# Patient Record
Sex: FEMALE | Race: OTHER | NOT HISPANIC OR LATINO
[De-identification: names, ages, dates, MRNs, and addresses within clinical notes are randomized per-mention and may not be internally consistent; named-entity substitution may affect disease eponyms.]

---

## 2018-01-05 PROBLEM — Z00.00 ENCOUNTER FOR PREVENTIVE HEALTH EXAMINATION: Status: ACTIVE | Noted: 2018-01-05

## 2018-01-16 ENCOUNTER — APPOINTMENT (OUTPATIENT)
Dept: VASCULAR SURGERY | Facility: CLINIC | Age: 63
End: 2018-01-16
Payer: COMMERCIAL

## 2018-01-16 VITALS
WEIGHT: 140 LBS | HEIGHT: 66 IN | DIASTOLIC BLOOD PRESSURE: 60 MMHG | SYSTOLIC BLOOD PRESSURE: 130 MMHG | BODY MASS INDEX: 22.5 KG/M2

## 2018-01-16 DIAGNOSIS — Z86.39 PERSONAL HISTORY OF OTHER ENDOCRINE, NUTRITIONAL AND METABOLIC DISEASE: ICD-10-CM

## 2018-01-16 DIAGNOSIS — Z78.9 OTHER SPECIFIED HEALTH STATUS: ICD-10-CM

## 2018-01-16 DIAGNOSIS — Z86.79 PERSONAL HISTORY OF OTHER DISEASES OF THE CIRCULATORY SYSTEM: ICD-10-CM

## 2018-01-16 DIAGNOSIS — Z85.3 PERSONAL HISTORY OF MALIGNANT NEOPLASM OF BREAST: ICD-10-CM

## 2018-01-16 PROCEDURE — 93880 EXTRACRANIAL BILAT STUDY: CPT

## 2018-01-16 PROCEDURE — 99213 OFFICE O/P EST LOW 20 MIN: CPT

## 2018-01-16 RX ORDER — POLYETHYLENE GLYCOL AND PROPYLENE GLYCOL 4; 3 MG/ML; MG/ML
0.4-0.3 SOLUTION/ DROPS OPHTHALMIC
Refills: 0 | Status: ACTIVE | COMMUNITY

## 2018-01-16 RX ORDER — ASPIRIN 325 MG/1
TABLET, FILM COATED ORAL
Refills: 0 | Status: ACTIVE | COMMUNITY

## 2018-01-16 RX ORDER — ANASTROZOLE TABLETS 1 MG/1
1 TABLET ORAL
Refills: 0 | Status: ACTIVE | COMMUNITY

## 2018-01-16 RX ORDER — CETIRIZINE HYDROCHLORIDE 10 MG/1
10 CAPSULE, LIQUID FILLED ORAL
Refills: 0 | Status: ACTIVE | COMMUNITY

## 2018-01-16 RX ORDER — CALCIUM CARBONATE/VITAMIN D3 600 MG-20
600-800 TABLET ORAL
Refills: 0 | Status: ACTIVE | COMMUNITY

## 2018-01-16 RX ORDER — MAGNESIUM OXIDE/MAG AA CHELATE 300 MG
CAPSULE ORAL
Refills: 0 | Status: ACTIVE | COMMUNITY

## 2018-01-16 RX ORDER — LEVOTHYROXINE SODIUM 50 UG/1
50 TABLET ORAL
Refills: 0 | Status: ACTIVE | COMMUNITY

## 2018-01-16 RX ORDER — ROSUVASTATIN CALCIUM 5 MG/1
5 TABLET, FILM COATED ORAL
Refills: 0 | Status: ACTIVE | COMMUNITY

## 2019-01-18 ENCOUNTER — APPOINTMENT (OUTPATIENT)
Dept: VASCULAR SURGERY | Facility: CLINIC | Age: 64
End: 2019-01-18
Payer: MEDICARE

## 2019-01-18 VITALS — WEIGHT: 140 LBS | SYSTOLIC BLOOD PRESSURE: 120 MMHG | DIASTOLIC BLOOD PRESSURE: 70 MMHG | BODY MASS INDEX: 22.6 KG/M2

## 2019-01-18 PROCEDURE — 93880 EXTRACRANIAL BILAT STUDY: CPT

## 2019-01-18 PROCEDURE — 99213 OFFICE O/P EST LOW 20 MIN: CPT

## 2019-01-18 NOTE — CONSULT LETTER
[Dear  ___] : Dear  [unfilled], [Courtesy Letter:] : I had the pleasure of seeing your patient, [unfilled], in my office today. [Please see my note below.] : Please see my note below. [Consult Closing:] : Thank you very much for allowing me to participate in the care of this patient.  If you have any questions, please do not hesitate to contact me. [FreeTextEntry2] : Dr Brayden Johnson

## 2019-01-18 NOTE — DATA REVIEWED
[FreeTextEntry1] : Duplex ultrasound today shows mildly elevated velocities in bilateral carotid arteries consistent with 50% stenosis bilaterally

## 2020-01-17 ENCOUNTER — OTHER (OUTPATIENT)
Age: 65
End: 2020-01-17

## 2020-01-17 ENCOUNTER — APPOINTMENT (OUTPATIENT)
Dept: VASCULAR SURGERY | Facility: CLINIC | Age: 65
End: 2020-01-17

## 2021-04-06 ENCOUNTER — APPOINTMENT (OUTPATIENT)
Dept: VASCULAR SURGERY | Facility: CLINIC | Age: 66
End: 2021-04-06

## 2021-04-12 ENCOUNTER — APPOINTMENT (OUTPATIENT)
Dept: VASCULAR SURGERY | Facility: CLINIC | Age: 66
End: 2021-04-12
Payer: MEDICARE

## 2021-04-12 VITALS — SYSTOLIC BLOOD PRESSURE: 132 MMHG | DIASTOLIC BLOOD PRESSURE: 74 MMHG

## 2021-04-12 DIAGNOSIS — I65.23 OCCLUSION AND STENOSIS OF BILATERAL CAROTID ARTERIES: ICD-10-CM

## 2021-04-12 PROCEDURE — 99213 OFFICE O/P EST LOW 20 MIN: CPT

## 2021-04-12 PROCEDURE — 93880 EXTRACRANIAL BILAT STUDY: CPT

## 2021-04-12 NOTE — ASSESSMENT
[FreeTextEntry1] : The patient is a 65-year-old female who presents for her annual carotid duplex exam. The patient denies amaurosis fugax or TIA like symptoms. I performed a carotid duplex in my office that shows less than 50% internal carotid artery stenosis bilaterally. No vascular surgery intervention at this time. I recommend to remain on antiplatelet regimen daily and I will see her back in my office in one years time

## 2023-10-19 ENCOUNTER — NON-APPOINTMENT (OUTPATIENT)
Age: 68
End: 2023-10-19

## 2024-05-09 ENCOUNTER — APPOINTMENT (OUTPATIENT)
Dept: UROGYNECOLOGY | Facility: CLINIC | Age: 69
End: 2024-05-09
Payer: MEDICARE

## 2024-05-09 VITALS
HEART RATE: 87 BPM | BODY MASS INDEX: 22.02 KG/M2 | HEIGHT: 66 IN | DIASTOLIC BLOOD PRESSURE: 74 MMHG | WEIGHT: 137 LBS | SYSTOLIC BLOOD PRESSURE: 120 MMHG

## 2024-05-09 DIAGNOSIS — Z80.9 FAMILY HISTORY OF MALIGNANT NEOPLASM, UNSPECIFIED: ICD-10-CM

## 2024-05-09 DIAGNOSIS — Z82.49 FAMILY HISTORY OF ISCHEMIC HEART DISEASE AND OTHER DISEASES OF THE CIRCULATORY SYSTEM: ICD-10-CM

## 2024-05-09 DIAGNOSIS — N95.0 POSTMENOPAUSAL BLEEDING: ICD-10-CM

## 2024-05-09 DIAGNOSIS — I65.23 OCCLUSION AND STENOSIS OF BILATERAL CAROTID ARTERIES: ICD-10-CM

## 2024-05-09 DIAGNOSIS — Z84.89 FAMILY HISTORY OF OTHER SPECIFIED CONDITIONS: ICD-10-CM

## 2024-05-09 DIAGNOSIS — N95.2 POSTMENOPAUSAL ATROPHIC VAGINITIS: ICD-10-CM

## 2024-05-09 DIAGNOSIS — Z86.39 PERSONAL HISTORY OF OTHER ENDOCRINE, NUTRITIONAL AND METABOLIC DISEASE: ICD-10-CM

## 2024-05-09 PROCEDURE — 99205 OFFICE O/P NEW HI 60 MIN: CPT | Mod: 25

## 2024-05-09 PROCEDURE — 99459 PELVIC EXAMINATION: CPT

## 2024-05-09 PROCEDURE — 51701 INSERT BLADDER CATHETER: CPT

## 2024-05-09 RX ORDER — ESTRADIOL 0.1 MG/G
0.1 CREAM VAGINAL
Qty: 1 | Refills: 3 | Status: ACTIVE | COMMUNITY
Start: 2024-05-09 | End: 1900-01-01

## 2024-05-13 LAB — URINE CULTURE <10: NORMAL

## 2024-05-30 ENCOUNTER — APPOINTMENT (OUTPATIENT)
Dept: UROGYNECOLOGY | Facility: CLINIC | Age: 69
End: 2024-05-30

## 2024-06-03 ENCOUNTER — APPOINTMENT (OUTPATIENT)
Dept: UROGYNECOLOGY | Facility: CLINIC | Age: 69
End: 2024-06-03
Payer: MEDICARE

## 2024-06-03 VITALS
SYSTOLIC BLOOD PRESSURE: 120 MMHG | DIASTOLIC BLOOD PRESSURE: 68 MMHG | HEART RATE: 77 BPM | HEIGHT: 66 IN | BODY MASS INDEX: 22.18 KG/M2 | WEIGHT: 138 LBS

## 2024-06-03 DIAGNOSIS — N81.3 COMPLETE UTEROVAGINAL PROLAPSE: ICD-10-CM

## 2024-06-03 DIAGNOSIS — R33.9 RETENTION OF URINE, UNSPECIFIED: ICD-10-CM

## 2024-06-03 PROCEDURE — A4562: CPT

## 2024-06-03 PROCEDURE — 57160 INSERT PESSARY/OTHER DEVICE: CPT

## 2024-06-03 RX ORDER — AMLODIPINE BESYLATE 2.5 MG/1
2.5 TABLET ORAL
Refills: 0 | Status: ACTIVE | COMMUNITY

## 2024-06-03 NOTE — END OF VISIT
[TextEntry] : 58m E&M, >50% spent in direct face to face counseling/coordination of care complete uterovaginal prolapse, atrophic vaginitis, postmenopausal bleeding, incomplete bladder emptying. This excludes cath. All questions answered. Patient reassured.

## 2024-06-03 NOTE — HISTORY OF PRESENT ILLNESS
[FreeTextEntry1] : Pt with pelvic floor dysfunction here for urogynecologic evaluation. 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  Pelvic organ prolapse: +bulge, for 3 years, worsened for the last 6 months, no splinting Stress urinary incontinence: denies Overactive bladder syndrome: min Voiding dysfunction: no Incomplete bladder emptying, yes hesitancy  Lower urinary tract/vaginal symptoms: no recurrent UTIs per year, no hematuria, no dysuria, no bladder pain  Fecal incontinence: denies Defecatory dysfunction: sausage Sexual dysfunction: not active Pelvic pain: denies Vaginal dryness: hx of breast cancer, on anaztrozole, +dryness, saw blood 10 days ago, was told by her oncology provider that she can use vaginal estrogen cream  Her pelvic floor symptoms are significantly bothersome and negatively impacting her quality of life.

## 2024-06-03 NOTE — REASON FOR VISIT
[TextEntry] :  Reason for visit: New Patient  Voids per day: 6  Voids per night: 1  Urge incontinence: Sometimes   Stress incontinence:  No  Constipation: No  Fecal incontinence: No  Vaginal bulge: Yes

## 2024-06-03 NOTE — DISCUSSION/SUMMARY
[FreeTextEntry1] :  Complete uterovaginal prolapse- The patient was counseled regarding the possible natural progression of prolapse and the clinical consequences of worsening prolapse. The stage and the location of the prolapse was reviewed with the patient. She was counseled regarding the management strategies including observation but if the prolapse is the cause of her incomplete bladder emptying then she can develop kidney damage, pessary placement and surgery (D&C/Lefort colpocleisis versus robotic hysterectomy/BS0/sacrocolpopexy). The patient voiced understanding and agrees with having surgery in [id number] (will have to think about which type of surgery she wants to have) but wants pessary management now so that she can garden without bothersome prolapse.  Atrophic vaginitis- We reviewed the risks, benefits, alternatives and indications of local estrogen therapy and I gave her a handout that covers this information. She does opt to begin this therapy. I have given her a prescription and specific instructions on how to use the estrogen cream, applied with a finger at a low dose for urogenital atrophy.  Postmenopausal bleeding-  Advised further workup with a pelvic ultrasound to make sure the uterine lining is thin. Advised the patient that without a workup there is always a risk of uterine cancer or precancer that is not being diagnosed. The patient voiced understanding and agrees to the workup.  Incomplete bladder emptying- Advised the patient that this can be due to an acute UTI or prolapse or other etiologies. Will send her urine to rule out infectious etiology. If not infected then I will recommend further workup with urodynamics (with reduction). Discussed with the patient that we are concerned about incomplete bladder emptying because it can cause recurrent UTI and can cause kidney damage. The patient voiced understanding.

## 2024-06-03 NOTE — COUNSELING
[FreeTextEntry1] :  We will notify you of the urine results if they are abnormal.  Apply a pea size amount of the cream to the opening of the vagina every night for two weeks followed by three nights per week  Please call my office if you have any issues with the cost or side effects of the medication.  Please schedule the vaginal sonogram to make sure the lining of the uterus and the ovaries are normal Pelvic sono at the [facility] [phone] [address] [facility]  Referral for routine gyn care: Dr Kerri Gonzalez [address] [place] [address]   Please schedule a pessary fitting with either Macie Solorio or Louann Solorio  Schedule bladder function testing with Louann Solorio or Macie Solorio (UDS with reduction). Please come with a full bladder. Please do not press on your belly or near the vagina or lean forward when you try to urinate during the test.  Please call the office if you feel like you have an infection because we can not do the bladder function testing in the setting of an infection  Schedule surgical counseling visit with Dr Jorgensen  Please review the surgical handouts for both surgeries

## 2024-06-03 NOTE — PHYSICAL EXAM
[Chaperone Present] : A chaperone was present in the examining room during all aspects of the physical examination [26499] : A chaperone was present during the pelvic exam. [No Acute Distress] : in no acute distress [Well developed] : well developed [Well Nourished] : ~L well nourished [FreeTextEntry2] : Romina LAIRD

## 2024-06-03 NOTE — PHYSICAL EXAM
[Chaperone Present] : A chaperone was present in the examining room during all aspects of the physical examination [77644] : A chaperone was present during the pelvic exam. [FreeTextEntry2] : Holli  [FreeTextEntry1] : Void: 175 cc PVR:  120 cc Urethra was prepped in sterile fashion and then a sterile non- indwelling catheter (14F) was used by me to drain the bladder. The patient tolerated the procedure well. Indication: Incomplete Bladder Emptying  GH:  7   pB:  4  TVL: 9  C: +8   D:  -4 Aa: +3  Ba: +8 Ap: +3 Bp: +8   No abdominal incisions normal perineal sensation normal perineal reflexes - cough stress test + atrophy + urethral caruncle + bilateral levator ani spasm,  - tenderness - urethral tenderness - bladder tenderness - cervical tenderness 1/5 Kegel

## 2024-06-03 NOTE — COUNSELING
[FreeTextEntry1] : Please call the office if you have any issues with vaginal pain, vaginal bleeding, difficulty urinating or having a bowel movement or if the pessary falls out so that we can arrange another size pessary.  Please continue to apply a pea size amount of the cream to the opening of the vagina three nights per week.  Please follow up for pessary check in 3-4 weeks with RASHAUN Quijano  (also PVR check)

## 2024-06-03 NOTE — HISTORY OF PRESENT ILLNESS
[FreeTextEntry1] : Patient is here for pessary fitting. GH: 7 TVL: 9 Last seen 5/9/2024 for prolapse  GH: 7 pB: 4 TVL: 9 C: +8 D: -4 Aa: +3 Ba: +8 Ap: +3 Bp: +8  cc at new patient visit  4/17/2023: uterus 5.3cm posterior fibroid 7mm, endometrial lining 3mm, R0V cyst 6mm 11/11/2022: uterus 6.2cm posterior fibroid 9mm, endometrial lining 2mm, L0V cyst 2mm RN  hx of breast cancer, on anaztrozole, +dryness, saw blood 10 days ago, was told by her oncology provider that she can use vaginal estrogen cream  Estrace cream for atrophy pelvic sono ordered last visit  No complaints today. She is here to try a pessary.

## 2024-06-03 NOTE — REASON FOR VISIT
[TextEntry] : Reason for visit: Pessary Fitting Voids per day: 6   Voids per night: 1   Urge incontinence: No Stress incontinence: No Constipation: No    Fecal incontinence: No    Vaginal bulge: Yes

## 2024-06-03 NOTE — DISCUSSION/SUMMARY
[FreeTextEntry1] : Complete Uterovaginal Prolapse: Ring and support # 6 placed without difficulty. Remained in place with Valsalva, coughing, and ambulating.  The patient tolerated all fittings well. NEW Ring and support # 6 placed Precautions reviewed Will return for follow up pessary check in 3-4 weeks or earlier if she has any issues  Incomplete bladder emptying Will check PVR at next visit with reduction  Will continue to use estrace cream three times a week

## 2024-07-03 ENCOUNTER — APPOINTMENT (OUTPATIENT)
Dept: UROGYNECOLOGY | Facility: CLINIC | Age: 69
End: 2024-07-03
Payer: MEDICARE

## 2024-07-03 VITALS
SYSTOLIC BLOOD PRESSURE: 113 MMHG | DIASTOLIC BLOOD PRESSURE: 64 MMHG | HEART RATE: 78 BPM | HEIGHT: 66 IN | WEIGHT: 138 LBS | BODY MASS INDEX: 22.18 KG/M2

## 2024-07-03 DIAGNOSIS — N95.0 POSTMENOPAUSAL BLEEDING: ICD-10-CM

## 2024-07-03 PROCEDURE — 99459 PELVIC EXAMINATION: CPT

## 2024-07-03 PROCEDURE — 99214 OFFICE O/P EST MOD 30 MIN: CPT | Mod: 25

## 2024-07-03 PROCEDURE — A4562: CPT

## 2024-07-03 PROCEDURE — 57160 INSERT PESSARY/OTHER DEVICE: CPT

## 2024-07-09 ENCOUNTER — OUTPATIENT (OUTPATIENT)
Dept: OUTPATIENT SERVICES | Facility: HOSPITAL | Age: 69
LOS: 1 days | End: 2024-07-09
Payer: MEDICARE

## 2024-07-09 ENCOUNTER — APPOINTMENT (OUTPATIENT)
Dept: ANTEPARTUM | Facility: CLINIC | Age: 69
End: 2024-07-09

## 2024-07-09 ENCOUNTER — ASOB RESULT (OUTPATIENT)
Age: 69
End: 2024-07-09

## 2024-07-09 ENCOUNTER — APPOINTMENT (OUTPATIENT)
Dept: ANTEPARTUM | Facility: CLINIC | Age: 69
End: 2024-07-09
Payer: MEDICARE

## 2024-07-09 ENCOUNTER — OUTPATIENT (OUTPATIENT)
Dept: OUTPATIENT SERVICES | Facility: HOSPITAL | Age: 69
LOS: 1 days | End: 2024-07-09

## 2024-07-09 DIAGNOSIS — Z00.00 ENCOUNTER FOR GENERAL ADULT MEDICAL EXAMINATION WITHOUT ABNORMAL FINDINGS: ICD-10-CM

## 2024-07-09 PROCEDURE — 76856 US EXAM PELVIC COMPLETE: CPT

## 2024-07-09 PROCEDURE — 76830 TRANSVAGINAL US NON-OB: CPT | Mod: 26

## 2024-07-09 PROCEDURE — 99212 OFFICE O/P EST SF 10 MIN: CPT | Mod: 25

## 2024-07-09 PROCEDURE — 76856 US EXAM PELVIC COMPLETE: CPT | Mod: 26,59

## 2024-07-09 PROCEDURE — 76830 TRANSVAGINAL US NON-OB: CPT

## 2024-07-11 DIAGNOSIS — N95.0 POSTMENOPAUSAL BLEEDING: ICD-10-CM

## 2024-07-11 DIAGNOSIS — N81.4 UTEROVAGINAL PROLAPSE, UNSPECIFIED: ICD-10-CM

## 2024-07-11 DIAGNOSIS — Z85.3 PERSONAL HISTORY OF MALIGNANT NEOPLASM OF BREAST: ICD-10-CM

## 2024-07-11 DIAGNOSIS — N83.209 UNSPECIFIED OVARIAN CYST, UNSPECIFIED SIDE: ICD-10-CM

## 2024-07-11 DIAGNOSIS — D25.1 INTRAMURAL LEIOMYOMA OF UTERUS: ICD-10-CM

## 2024-07-13 ENCOUNTER — TRANSCRIPTION ENCOUNTER (OUTPATIENT)
Age: 69
End: 2024-07-13

## 2024-07-22 ENCOUNTER — APPOINTMENT (OUTPATIENT)
Dept: UROGYNECOLOGY | Facility: CLINIC | Age: 69
End: 2024-07-22
Payer: MEDICARE

## 2024-07-22 ENCOUNTER — RESULT CHARGE (OUTPATIENT)
Age: 69
End: 2024-07-22

## 2024-07-22 VITALS
SYSTOLIC BLOOD PRESSURE: 114 MMHG | DIASTOLIC BLOOD PRESSURE: 68 MMHG | WEIGHT: 138 LBS | BODY MASS INDEX: 22.18 KG/M2 | HEART RATE: 84 BPM | HEIGHT: 66 IN

## 2024-07-22 DIAGNOSIS — N81.3 COMPLETE UTEROVAGINAL PROLAPSE: ICD-10-CM

## 2024-07-22 LAB
BILIRUB UR QL STRIP: NORMAL
CLARITY UR: CLEAR
COLLECTION METHOD: NORMAL
GLUCOSE UR-MCNC: NORMAL
HCG UR QL: 0.2 EU/DL
HGB UR QL STRIP.AUTO: NORMAL
KETONES UR-MCNC: NORMAL
LEUKOCYTE ESTERASE UR QL STRIP: NORMAL
NITRITE UR QL STRIP: NORMAL
PH UR STRIP: 7
PROT UR STRIP-MCNC: NORMAL
SP GR UR STRIP: 1.01

## 2024-07-22 PROCEDURE — 51729 CYSTOMETROGRAM W/VP&UP: CPT

## 2024-07-22 PROCEDURE — 51784 ANAL/URINARY MUSCLE STUDY: CPT

## 2024-07-22 PROCEDURE — 81003 URINALYSIS AUTO W/O SCOPE: CPT | Mod: QW

## 2024-07-22 PROCEDURE — 51741 ELECTRO-UROFLOWMETRY FIRST: CPT

## 2024-07-22 PROCEDURE — 51797 INTRAABDOMINAL PRESSURE TEST: CPT

## 2024-07-26 LAB
A VAGINAE DNA VAG QL NAA+PROBE: NORMAL
BVAB2 DNA VAG QL NAA+PROBE: NORMAL
C KRUSEI DNA VAG QL NAA+PROBE: NEGATIVE
C TRACH RRNA SPEC QL NAA+PROBE: NEGATIVE
CANDIDA DNA VAG QL NAA+PROBE: POSITIVE
MEGA1 DNA VAG QL NAA+PROBE: NORMAL
N GONORRHOEA RRNA SPEC QL NAA+PROBE: NEGATIVE
T VAGINALIS RRNA SPEC QL NAA+PROBE: NEGATIVE

## 2024-07-26 RX ORDER — FLUCONAZOLE 150 MG/1
150 TABLET ORAL
Qty: 2 | Refills: 0 | Status: ACTIVE | COMMUNITY
Start: 2024-07-26 | End: 1900-01-01

## 2024-08-15 ENCOUNTER — APPOINTMENT (OUTPATIENT)
Dept: UROGYNECOLOGY | Facility: CLINIC | Age: 69
End: 2024-08-15
Payer: MEDICARE

## 2024-08-15 VITALS
DIASTOLIC BLOOD PRESSURE: 62 MMHG | HEART RATE: 84 BPM | HEIGHT: 66 IN | SYSTOLIC BLOOD PRESSURE: 110 MMHG | BODY MASS INDEX: 22.18 KG/M2 | WEIGHT: 138 LBS

## 2024-08-15 DIAGNOSIS — N81.3 COMPLETE UTEROVAGINAL PROLAPSE: ICD-10-CM

## 2024-08-15 PROCEDURE — 99215 OFFICE O/P EST HI 40 MIN: CPT

## 2024-08-18 NOTE — REASON FOR VISIT
[TextEntry] : Reason for visit: Surgical Counseling Voids per day:  6  Voids per night: 1   Urge incontinence: No Stress incontinence: No Constipation: No Fecal incontinence: No Vaginal bulge: No, with pessary in place

## 2024-08-18 NOTE — COUNSELING
[FreeTextEntry1] :  Please call the office if you have any issues with vaginal pain, vaginal bleeding, difficulty urinating or having a bowel movement or if the pessary falls out so that we can arrange another size pessary.  Please return for your next pessary check on Oct 21 2024, where Louann will REMOVE the pessary for surgery   Your surgery will be scheduled for November 12 at the [facility]   1.Your surgery will be as follows: robotic hysterectomy/removal of tubes and ovaries, support of bladder/vagina and rectum with mesh to the tissue on the tailbone/cystoscopy 2. Your surgery will be done through the abdomen and vagina 3. You will need to have bloodwork done before your surgery (we will give you more instructions regarding this). 4. You will need to obtain clearance from: your cardiologist and your primary care doctor prior to your surgery (give yourself plenty of time to do this). 5. After surgery, you can take Motrin 600mg every 6 hours and Tylenol 650mg every 6 hours. If these don't alleviate your pain, you can also take the stronger prescribed pain medication as instructed. 6. Please use Miralax to avoid constipation after your surgery. Start the day after surgery and use a capful each day until you see me for your postoperative visit. 7. No heavy lifting and nothing in the vagina for 8 weeks after surgery. 8. Overall recovery time after surgery is around 8 weeks. 9. You will have a catheter draining your bladder when you wake up after surgery and there is a chance you may need to go home with the catheter for a few days. You will then return to the office to have it removed. 10. You will need a 2 week postoperative appointment with the PA and then an 8 week postoperative visit with me after your surgery. 11. Lindy Sommer will contact you with your presurgical testing date. If you have any questions/concerns, please call [phone].

## 2024-08-18 NOTE — END OF VISIT
[TextEntry] : 38m E&M, >50% spent in direct face to face counseling/coordination of care complete uterovaginal prolapse. All questions answered. Patient reassured.

## 2024-08-18 NOTE — HISTORY OF PRESENT ILLNESS
[FreeTextEntry1] : The patient is here for surgical counseling for complete uterovaginal prolapse GH: 7 pB: 4 TVL: 9 C: +8 D: -4 Aa: +3 Ba: +8 Ap: +3 Bp: +8  cc at new patient visit No abdominal surgeries  7/9/24-vaginal sonogram - normal (uterus 7cm, endometrial lining 2mm, R0V simple cyst, L0V not seen) 4/17/2023: uterus 5.3cm posterior fibroid 7mm, endometrial lining 3mm, R0V cyst 6mm 11/11/2022: uterus 6.2cm posterior fibroid 9mm, endometrial lining 2mm, L0V cyst 2mm 11/11/2022: pap negative HPV negative Estrace cream for atrophy  Happy with the [place] #5 but wants to have surgical management  7/22/2024 urodynamics: Impression: no USUI, no obstructive voiding Plan: no incontinence procedure with prolapse surgery  Was to consider if she wanted a robotic hysterectomy/sacrocolpopexy versus Lefort colpocleisis  RN

## 2024-08-18 NOTE — DISCUSSION/SUMMARY
[FreeTextEntry1] :  Complete uterovaginal prolapse- The risks and benefits of robotic hysterectomy versus Lefort colpocleisis was reviewed. The patient wants to preserve the length of the vagina despite the increased risk of intrabdominal surgery.  The surgical procedure of exam under anesthesia/robotic hysterectomy/BS0/sacrocolpopexy/ycsto was reviewed. The patient was advised that the surgery does not improve urge incontinence and can worsen those symptoms. The postoperative restrictions were reviewed. All of the patient's questions and concerns were answered.   The interpretation of the urodynamics was reviewed. The patient was also counseled that although the urodynamics showed that she is not likely to develop stress incontinence after her prolapse surgery, there is still a risk that it can develop postoperatively.  The patient was counseled that the risk of usage of mesh for sacrocolpopexy is only 6%. These risks include a risk of developing pain in the vagina +/- with intercourse and a risk of mesh exposure into the vagina or bowel or bladder.  The patient was counseled that if her abdomen has adhesions, it will then extend the operative time and extend the anesthesia time and its associated risks.   She was also advised that if her abdomen has adhesions that she is at increased risk of injury to the surrounding areas including bowel, bladder, nerves, blood vessels and ureters and increased risk of needing to convert to laparotomy.  The patient was counseled that there is a risk of infection, bleeding requiring transfusion, risk of heart attack, stroke, paralysis and even death.  The risks and benefits and alternatives of the above procedures were reviewed and informed consent was signed. The patient will be scheduled for surgery, preop lab testing and preop medical eval.  THE PATIENT WAS ADVISED THAT AT HER NEXT PESSARY CHECK WE WILL REMOVE THE PESSARY SO THAT THE PROLAPSE CAN COME BACK DOWN BY THE DATE OF HER SURGERY.

## 2024-08-18 NOTE — HISTORY OF PRESENT ILLNESS
[FreeTextEntry1] : The patient is here for surgical counseling for complete uterovaginal prolapse GH: 7 pB: 4 TVL: 9 C: +8 D: -4 Aa: +3 Ba: +8 Ap: +3 Bp: +8  cc at new patient visit No abdominal surgeries  7/9/24-vaginal sonogram - normal (uterus 7cm, endometrial lining 2mm, R0V simple cyst, L0V not seen) 4/17/2023: uterus 5.3cm posterior fibroid 7mm, endometrial lining 3mm, R0V cyst 6mm 11/11/2022: uterus 6.2cm posterior fibroid 9mm, endometrial lining 2mm, L0V cyst 2mm 11/11/2022: pap negative HPV negative Estrace cream for atrophy  Happy with the [place] #5 but wants to have surgical management  7/22/2024 urodynamics: Impression: no USUI, no obstructive voiding Plan: no incontinence procedure with prolapse surgery  Was to consider if she wanted a robotic hysterectomy/sacrocolpopexy versus Lefort colpocleisis  RN

## 2024-08-18 NOTE — COUNSELING
[FreeTextEntry1] :  Please call the office if you have any issues with vaginal pain, vaginal bleeding, difficulty urinating or having a bowel movement or if the pessary falls out so that we can arrange another size pessary.  Please return for your next pessary check on Oct 21 2024, where Louann will REMOVE the pessary for surgery   Your surgery will be scheduled for November 12 at the [facility]   1.Your surgery will be as follows: robotic hysterectomy/removal of tubes and ovaries, support of bladder/vagina and rectum with mesh to the tissue on the tailbone/cystoscopy 2. Your surgery will be done through the abdomen and vagina 3. You will need to have bloodwork done before your surgery (we will give you more instructions regarding this). 4. You will need to obtain clearance from: your cardiologist and your primary care doctor prior to your surgery (give yourself plenty of time to do this). 5. After surgery, you can take Motrin 600mg every 6 hours and Tylenol 650mg every 6 hours. If these don't alleviate your pain, you can also take the stronger prescribed pain medication as instructed. 6. Please use Miralax to avoid constipation after your surgery. Start the day after surgery and use a capful each day until you see me for your postoperative visit. 7. No heavy lifting and nothing in the vagina for 8 weeks after surgery. 8. Overall recovery time after surgery is around 8 weeks. 9. You will have a catheter draining your bladder when you wake up after surgery and there is a chance you may need to go home with the catheter for a few days. You will then return to the office to have it removed. 10. You will need a 2 week postoperative appointment with the PA and then an 8 week postoperative visit with me after your surgery. 11. Lindy Sommer will contact you with your presurgical testing date. If you have any questions/concerns, please call [phone].

## 2024-10-18 ENCOUNTER — APPOINTMENT (OUTPATIENT)
Dept: UROGYNECOLOGY | Facility: CLINIC | Age: 69
End: 2024-10-18

## 2024-10-18 VITALS
DIASTOLIC BLOOD PRESSURE: 73 MMHG | BODY MASS INDEX: 22.18 KG/M2 | HEART RATE: 91 BPM | HEIGHT: 66 IN | SYSTOLIC BLOOD PRESSURE: 116 MMHG | WEIGHT: 138 LBS

## 2024-10-18 DIAGNOSIS — N89.8 OTHER SPECIFIED NONINFLAMMATORY DISORDERS OF VAGINA: ICD-10-CM

## 2024-10-18 DIAGNOSIS — N81.3 COMPLETE UTEROVAGINAL PROLAPSE: ICD-10-CM

## 2024-10-18 PROCEDURE — 99214 OFFICE O/P EST MOD 30 MIN: CPT

## 2024-10-18 PROCEDURE — G2211 COMPLEX E/M VISIT ADD ON: CPT

## 2024-10-18 PROCEDURE — 99459 PELVIC EXAMINATION: CPT

## 2024-10-29 ENCOUNTER — OUTPATIENT (OUTPATIENT)
Dept: OUTPATIENT SERVICES | Facility: HOSPITAL | Age: 69
LOS: 1 days | End: 2024-10-29
Payer: MEDICARE

## 2024-10-29 VITALS
TEMPERATURE: 97 F | HEIGHT: 66 IN | SYSTOLIC BLOOD PRESSURE: 128 MMHG | DIASTOLIC BLOOD PRESSURE: 75 MMHG | OXYGEN SATURATION: 98 % | RESPIRATION RATE: 16 BRPM | WEIGHT: 138.89 LBS | HEART RATE: 62 BPM

## 2024-10-29 DIAGNOSIS — Z90.11 ACQUIRED ABSENCE OF RIGHT BREAST AND NIPPLE: Chronic | ICD-10-CM

## 2024-10-29 DIAGNOSIS — Z98.890 OTHER SPECIFIED POSTPROCEDURAL STATES: Chronic | ICD-10-CM

## 2024-10-29 DIAGNOSIS — N81.3 COMPLETE UTEROVAGINAL PROLAPSE: ICD-10-CM

## 2024-10-29 DIAGNOSIS — Z01.818 ENCOUNTER FOR OTHER PREPROCEDURAL EXAMINATION: ICD-10-CM

## 2024-10-29 LAB
ALBUMIN SERPL ELPH-MCNC: 4.4 G/DL — SIGNIFICANT CHANGE UP (ref 3.5–5.2)
ALP SERPL-CCNC: 88 U/L — SIGNIFICANT CHANGE UP (ref 30–115)
ALT FLD-CCNC: 24 U/L — SIGNIFICANT CHANGE UP (ref 0–41)
ANION GAP SERPL CALC-SCNC: 9 MMOL/L — SIGNIFICANT CHANGE UP (ref 7–14)
APPEARANCE UR: CLEAR — SIGNIFICANT CHANGE UP
APTT BLD: 37.1 SEC — SIGNIFICANT CHANGE UP (ref 27–39.2)
AST SERPL-CCNC: 24 U/L — SIGNIFICANT CHANGE UP (ref 0–41)
BASOPHILS # BLD AUTO: 0.05 K/UL — SIGNIFICANT CHANGE UP (ref 0–0.2)
BASOPHILS NFR BLD AUTO: 0.6 % — SIGNIFICANT CHANGE UP (ref 0–1)
BILIRUB SERPL-MCNC: 0.3 MG/DL — SIGNIFICANT CHANGE UP (ref 0.2–1.2)
BILIRUB UR-MCNC: NEGATIVE — SIGNIFICANT CHANGE UP
BLD GP AB SCN SERPL QL: SIGNIFICANT CHANGE UP
BUN SERPL-MCNC: 12 MG/DL — SIGNIFICANT CHANGE UP (ref 10–20)
CALCIUM SERPL-MCNC: 9.7 MG/DL — SIGNIFICANT CHANGE UP (ref 8.4–10.5)
CHLORIDE SERPL-SCNC: 101 MMOL/L — SIGNIFICANT CHANGE UP (ref 98–110)
CO2 SERPL-SCNC: 30 MMOL/L — SIGNIFICANT CHANGE UP (ref 17–32)
COLOR SPEC: YELLOW — SIGNIFICANT CHANGE UP
CREAT SERPL-MCNC: 0.6 MG/DL — LOW (ref 0.7–1.5)
DIFF PNL FLD: NEGATIVE — SIGNIFICANT CHANGE UP
EGFR: 96 ML/MIN/1.73M2 — SIGNIFICANT CHANGE UP
EOSINOPHIL # BLD AUTO: 0.14 K/UL — SIGNIFICANT CHANGE UP (ref 0–0.7)
EOSINOPHIL NFR BLD AUTO: 1.8 % — SIGNIFICANT CHANGE UP (ref 0–8)
GLUCOSE SERPL-MCNC: 80 MG/DL — SIGNIFICANT CHANGE UP (ref 70–99)
GLUCOSE UR QL: NEGATIVE MG/DL — SIGNIFICANT CHANGE UP
HCT VFR BLD CALC: 44.6 % — SIGNIFICANT CHANGE UP (ref 37–47)
HGB BLD-MCNC: 14.9 G/DL — SIGNIFICANT CHANGE UP (ref 12–16)
IMM GRANULOCYTES NFR BLD AUTO: 0.4 % — HIGH (ref 0.1–0.3)
INR BLD: 0.94 RATIO — SIGNIFICANT CHANGE UP (ref 0.65–1.3)
KETONES UR-MCNC: NEGATIVE MG/DL — SIGNIFICANT CHANGE UP
LEUKOCYTE ESTERASE UR-ACNC: NEGATIVE — SIGNIFICANT CHANGE UP
LYMPHOCYTES # BLD AUTO: 1.76 K/UL — SIGNIFICANT CHANGE UP (ref 1.2–3.4)
LYMPHOCYTES # BLD AUTO: 22.3 % — SIGNIFICANT CHANGE UP (ref 20.5–51.1)
MCHC RBC-ENTMCNC: 31.6 PG — HIGH (ref 27–31)
MCHC RBC-ENTMCNC: 33.4 G/DL — SIGNIFICANT CHANGE UP (ref 32–37)
MCV RBC AUTO: 94.7 FL — SIGNIFICANT CHANGE UP (ref 81–99)
MONOCYTES # BLD AUTO: 0.69 K/UL — HIGH (ref 0.1–0.6)
MONOCYTES NFR BLD AUTO: 8.7 % — SIGNIFICANT CHANGE UP (ref 1.7–9.3)
NEUTROPHILS # BLD AUTO: 5.22 K/UL — SIGNIFICANT CHANGE UP (ref 1.4–6.5)
NEUTROPHILS NFR BLD AUTO: 66.2 % — SIGNIFICANT CHANGE UP (ref 42.2–75.2)
NITRITE UR-MCNC: NEGATIVE — SIGNIFICANT CHANGE UP
NRBC # BLD: 0 /100 WBCS — SIGNIFICANT CHANGE UP (ref 0–0)
PH UR: 7 — SIGNIFICANT CHANGE UP (ref 5–8)
PLATELET # BLD AUTO: 289 K/UL — SIGNIFICANT CHANGE UP (ref 130–400)
PMV BLD: 11.4 FL — HIGH (ref 7.4–10.4)
POTASSIUM SERPL-MCNC: 4.3 MMOL/L — SIGNIFICANT CHANGE UP (ref 3.5–5)
POTASSIUM SERPL-SCNC: 4.3 MMOL/L — SIGNIFICANT CHANGE UP (ref 3.5–5)
PROT SERPL-MCNC: 6.8 G/DL — SIGNIFICANT CHANGE UP (ref 6–8)
PROT UR-MCNC: NEGATIVE MG/DL — SIGNIFICANT CHANGE UP
PROTHROM AB SERPL-ACNC: 10.7 SEC — SIGNIFICANT CHANGE UP (ref 9.95–12.87)
RBC # BLD: 4.71 M/UL — SIGNIFICANT CHANGE UP (ref 4.2–5.4)
RBC # FLD: 13.3 % — SIGNIFICANT CHANGE UP (ref 11.5–14.5)
SODIUM SERPL-SCNC: 140 MMOL/L — SIGNIFICANT CHANGE UP (ref 135–146)
SP GR SPEC: 1.01 — SIGNIFICANT CHANGE UP (ref 1–1.03)
UROBILINOGEN FLD QL: 0.2 MG/DL — SIGNIFICANT CHANGE UP (ref 0.2–1)
WBC # BLD: 7.89 K/UL — SIGNIFICANT CHANGE UP (ref 4.8–10.8)
WBC # FLD AUTO: 7.89 K/UL — SIGNIFICANT CHANGE UP (ref 4.8–10.8)

## 2024-10-29 PROCEDURE — 85610 PROTHROMBIN TIME: CPT

## 2024-10-29 PROCEDURE — 85730 THROMBOPLASTIN TIME PARTIAL: CPT

## 2024-10-29 PROCEDURE — 87086 URINE CULTURE/COLONY COUNT: CPT

## 2024-10-29 PROCEDURE — 86901 BLOOD TYPING SEROLOGIC RH(D): CPT

## 2024-10-29 PROCEDURE — 80053 COMPREHEN METABOLIC PANEL: CPT

## 2024-10-29 PROCEDURE — 86900 BLOOD TYPING SEROLOGIC ABO: CPT

## 2024-10-29 PROCEDURE — 36415 COLL VENOUS BLD VENIPUNCTURE: CPT

## 2024-10-29 PROCEDURE — 81003 URINALYSIS AUTO W/O SCOPE: CPT

## 2024-10-29 PROCEDURE — 99214 OFFICE O/P EST MOD 30 MIN: CPT | Mod: 25

## 2024-10-29 PROCEDURE — 85025 COMPLETE CBC W/AUTO DIFF WBC: CPT

## 2024-10-29 PROCEDURE — 86850 RBC ANTIBODY SCREEN: CPT

## 2024-10-29 NOTE — H&P PST ADULT - NSICDXFAMILYHX_GEN_ALL_CORE_FT
FAMILY HISTORY:  Father  Still living? No  Family history of myocardial infarction, Age at diagnosis: Age Unknown    Mother  Still living? No  FH: stroke, Age at diagnosis: Age Unknown    Child  Still living? Yes, Estimated age: Age Unknown  Family history of Burkitt's lymphoma, Age at diagnosis: Age Unknown

## 2024-10-29 NOTE — H&P PST ADULT - HISTORY OF PRESENT ILLNESS
Patient is a 70 yo female who presents to pretesting for the preparations of the above surgery due to complete uterovaginal prolapse, Incomplete bladder emptying and post menopausal bleedings.   Patient denies any cp, sob, palpitations, fever, cough, URI, abdominal pains, N/V, UTI, Rashes or open wounds.  As per patient exercise tolerance of 1-2 fos walks with out sob  Patient denies any s/s covid 19 and reports no contact with known positive people.   Anesthesia Alert  NO--Difficult Airway  NO--History of neck surgery or radiation  NO--Limited ROM of neck  NO--History of Malignant hyperthermia  NO--Personal or family history of Pseudocholinesterase deficiency  YES  Prior Anesthesia Complications. Nausea and Vomiting   NO--Latex Allergy  NO--Loose teeth  NO--History of Rheumatoid Arthritis  NO--KAVITA  NO-Bleeding Risk   Pt instructed to stop vitamins/supplements/herbal medications for one week prior to surgery  As per patient this is the complete medical, surgical history and medications.  Duke Activity Status Index (DASI) from FUJIAN HAIYUAN  on 10/29/2024  ** All calculations should be rechecked by clinician prior to use **  RESULT SUMMARY:  58.2 points  The higher the score (maximum 58.2), the higher the functional status.  9.89 METs  INPUTS:  Take care of self —> 2.75 = Yes  Walk indoors —> 1.75 = Yes  Walk 1&ndash;2 blocks on level ground —> 2.75 = Yes  Climb a flight of stairs or walk up a hill —> 5.5 = Yes  Run a short distance —> 8 = Yes  Do light work around the house —> 2.7 = Yes  Do moderate work around the house —> 3.5 = Yes  Do heavy work around the house —> 8 = Yes  Do yardwork —> 4.5 = Yes  Have sexual relations —> 5.25 = Yes  Participate in moderate recreational activities —> 6 = Yes  Participate in strenuous sports —> 7.5 = Yes  Revised Cardiac Risk Index for Pre-Operative Risk from FUJIAN HAIYUAN  on 10/29/2024  ** All calculations should be rechecked by clinician prior to use **  RESULT SUMMARY:  1 points  Class II Risk  6.0 %  30-day risk of death, MI, or cardiac arrest  From Baudilio 2017. These numbers are higher than those from the original study (Ray 1999). See Evidence for details.  INPUTS:  Elevated-risk surgery —> 1 = Yes  History of ischemic heart disease —> 0 = No  History of congestive heart failure —> 0 = No  History of cerebrovascular disease —> 0 = No  Pre-operative treatment with insulin —> 0 = No  Pre-operative creatinine >2 mg/dL / 176.8 µmol/L —> 0 = No

## 2024-10-29 NOTE — H&P PST ADULT - REASON FOR ADMISSION
Case Type: OP   Suite: [facility]  Proceduralist: Mariya Jorgensen  Confirmed Surgery Date Time: 11-   PAST Date Time: 10- - 11:00  Procedure: EXAM UNDER ANESTHESIA, ROBOTIC HYSTERECTOMY, BILATERAL SALPINGO OOPHORECTOMY, SACROCOLPOPEXY, POSSIBLE POSTERIOR COLPORRHAPHY PERINEORRHAPHY, CYSTOSCOPY, ALL INDICATED PROCEDURE.  Laterality: N/A  Length of Procedure: 300 Minutes  Anesthesia Type: General

## 2024-10-29 NOTE — H&P PST ADULT - NSANTHOSAYNRD_GEN_A_CORE
No. KAVITA screening performed.  STOP BANG Legend: 0-2 = LOW Risk; 3-4 = INTERMEDIATE Risk; 5-8 = HIGH Risk

## 2024-10-29 NOTE — H&P PST ADULT - NSICDXPASTMEDICALHX_GEN_ALL_CORE_FT
PAST MEDICAL HISTORY:  Cancer of right breast     HLD (hyperlipidemia)     HTN (hypertension)     Hypothyroidism     Maintenance chemotherapy     Prolapsed uterus

## 2024-10-30 DIAGNOSIS — N81.3 COMPLETE UTEROVAGINAL PROLAPSE: ICD-10-CM

## 2024-10-30 DIAGNOSIS — Z01.818 ENCOUNTER FOR OTHER PREPROCEDURAL EXAMINATION: ICD-10-CM

## 2024-10-30 LAB
CULTURE RESULTS: SIGNIFICANT CHANGE UP
SPECIMEN SOURCE: SIGNIFICANT CHANGE UP

## 2024-11-07 RX ORDER — FLUCONAZOLE 150 MG/1
150 TABLET ORAL
Qty: 2 | Refills: 0 | Status: ACTIVE | COMMUNITY
Start: 2024-11-07 | End: 1900-01-01

## 2024-11-11 NOTE — ASU PATIENT PROFILE, ADULT - FALL HARM RISK - ATTEMPT OOB
No Principal Discharge DX:	Migraine without status migrainosus, not intractable, unspecified migraine type

## 2024-11-12 ENCOUNTER — RESULT REVIEW (OUTPATIENT)
Age: 69
End: 2024-11-12

## 2024-11-12 ENCOUNTER — TRANSCRIPTION ENCOUNTER (OUTPATIENT)
Age: 69
End: 2024-11-12

## 2024-11-12 ENCOUNTER — OUTPATIENT (OUTPATIENT)
Dept: OUTPATIENT SERVICES | Facility: HOSPITAL | Age: 69
LOS: 1 days | Discharge: HOME | End: 2024-11-12
Payer: MEDICARE

## 2024-11-12 VITALS
OXYGEN SATURATION: 99 % | DIASTOLIC BLOOD PRESSURE: 58 MMHG | SYSTOLIC BLOOD PRESSURE: 123 MMHG | TEMPERATURE: 98 F | RESPIRATION RATE: 18 BRPM | WEIGHT: 138.89 LBS | HEART RATE: 84 BPM | HEIGHT: 66 IN

## 2024-11-12 VITALS
OXYGEN SATURATION: 98 % | TEMPERATURE: 98 F | HEART RATE: 88 BPM | RESPIRATION RATE: 18 BRPM | DIASTOLIC BLOOD PRESSURE: 55 MMHG | SYSTOLIC BLOOD PRESSURE: 118 MMHG

## 2024-11-12 DIAGNOSIS — Z98.890 OTHER SPECIFIED POSTPROCEDURAL STATES: Chronic | ICD-10-CM

## 2024-11-12 DIAGNOSIS — N81.3 COMPLETE UTEROVAGINAL PROLAPSE: ICD-10-CM

## 2024-11-12 DIAGNOSIS — Z90.11 ACQUIRED ABSENCE OF RIGHT BREAST AND NIPPLE: Chronic | ICD-10-CM

## 2024-11-12 LAB — ABO RH CONFIRMATION: SIGNIFICANT CHANGE UP

## 2024-11-12 PROCEDURE — 58571 TLH W/T/O 250 G OR LESS: CPT

## 2024-11-12 PROCEDURE — 88305 TISSUE EXAM BY PATHOLOGIST: CPT

## 2024-11-12 PROCEDURE — 88305 TISSUE EXAM BY PATHOLOGIST: CPT | Mod: 26

## 2024-11-12 PROCEDURE — C1781: CPT

## 2024-11-12 PROCEDURE — 36415 COLL VENOUS BLD VENIPUNCTURE: CPT

## 2024-11-12 PROCEDURE — 57425 LAPAROSCOPY SURG COLPOPEXY: CPT

## 2024-11-12 PROCEDURE — C9399: CPT

## 2024-11-12 PROCEDURE — S2900: CPT

## 2024-11-12 RX ORDER — ENOXAPARIN SODIUM 40MG/0.4ML
40 SYRINGE (ML) SUBCUTANEOUS ONCE
Refills: 0 | Status: COMPLETED | OUTPATIENT
Start: 2024-11-12 | End: 2024-11-12

## 2024-11-12 RX ORDER — MOMETASONE FUROATE MONOHYDRATE 50 UG/1
2 SPRAY, METERED NASAL
Refills: 0 | DISCHARGE

## 2024-11-12 RX ORDER — MAGNESIUM GLUCONATE 27.5 (500)
1 TABLET ORAL
Refills: 0 | DISCHARGE

## 2024-11-12 RX ORDER — ACETAMINOPHEN 500 MG
2 TABLET ORAL
Qty: 112 | Refills: 0
Start: 2024-11-12 | End: 2024-11-25

## 2024-11-12 RX ORDER — OXYCODONE 5 MG/1
5 TABLET ORAL
Qty: 12 | Refills: 0 | Status: ACTIVE | COMMUNITY
Start: 2024-11-12 | End: 1900-01-01

## 2024-11-12 RX ORDER — ANASTROZOLE 1 MG/1
1 TABLET ORAL
Refills: 0 | DISCHARGE

## 2024-11-12 RX ORDER — HYDROMORPHONE HCL/0.9% NACL/PF 6 MG/30 ML
0.5 PATIENT CONTROLLED ANALGESIA SYRINGE INTRAVENOUS
Refills: 0 | Status: DISCONTINUED | OUTPATIENT
Start: 2024-11-12 | End: 2024-11-12

## 2024-11-12 RX ORDER — IBUPROFEN 200 MG
1 TABLET ORAL
Qty: 56 | Refills: 0
Start: 2024-11-12 | End: 2024-11-25

## 2024-11-12 RX ORDER — ASPIRIN/MAG CARB/ALUMINUM AMIN 325 MG
0 TABLET ORAL
Refills: 0 | DISCHARGE

## 2024-11-12 RX ORDER — LEVOTHYROXINE SODIUM 88 MCG
1 TABLET ORAL
Refills: 0 | DISCHARGE

## 2024-11-12 RX ORDER — DIETHYLTOLUAMIDE 7 %
1 SPRAY, NON-AEROSOL (ML) TOPICAL
Refills: 0 | DISCHARGE

## 2024-11-12 RX ORDER — PHENAZOPYRIDINE HCL 95 MG
200 TABLET ORAL ONCE
Refills: 0 | Status: COMPLETED | OUTPATIENT
Start: 2024-11-12 | End: 2024-11-12

## 2024-11-12 RX ORDER — ESTRADIOL 1.25 MG/1.25G
1 GEL TOPICAL
Refills: 0 | DISCHARGE

## 2024-11-12 RX ORDER — ROSUVASTATIN CALCIUM 10 MG
1 TABLET ORAL
Refills: 0 | DISCHARGE

## 2024-11-12 RX ORDER — PHENAZOPYRIDINE HCL 95 MG
200 TABLET ORAL ONCE
Refills: 0 | Status: DISCONTINUED | OUTPATIENT
Start: 2024-11-12 | End: 2024-11-12

## 2024-11-12 RX ORDER — ONDANSETRON HYDROCHLORIDE 2 MG/ML
4 INJECTION, SOLUTION INTRAMUSCULAR; INTRAVENOUS ONCE
Refills: 0 | Status: DISCONTINUED | OUTPATIENT
Start: 2024-11-12 | End: 2024-11-12

## 2024-11-12 RX ORDER — GLUCOSAMINE SULFATE DIPOT CHLR 500 MG
0 CAPSULE ORAL
Refills: 0 | DISCHARGE

## 2024-11-12 RX ORDER — CETIRIZINE HCL 10 MG/1
1 TABLET ORAL
Refills: 0 | DISCHARGE

## 2024-11-12 RX ORDER — AMLODIPINE BESYLATE 10 MG
1 TABLET ORAL
Refills: 0 | DISCHARGE

## 2024-11-12 RX ORDER — ENOXAPARIN SODIUM 40MG/0.4ML
40 SYRINGE (ML) SUBCUTANEOUS ONCE
Refills: 0 | Status: DISCONTINUED | OUTPATIENT
Start: 2024-11-12 | End: 2024-11-12

## 2024-11-12 RX ADMIN — Medication 0.5 MILLIGRAM(S): at 15:53

## 2024-11-12 RX ADMIN — Medication 40 MILLIGRAM(S): at 08:44

## 2024-11-12 RX ADMIN — Medication 0.5 MILLIGRAM(S): at 15:38

## 2024-11-12 RX ADMIN — Medication 200 MILLIGRAM(S): at 08:45

## 2024-11-12 NOTE — BRIEF OPERATIVE NOTE - NSICDXBRIEFPROCEDURE_GEN_ALL_CORE_FT
PROCEDURES:  Hysterectomy, total, laparoscopic, with bilateral salpingo-oophorectomy and cystoscopy 12-Nov-2024 14:27:38  Mariya Jorgensen  Sacrocolpopexy, robot-assisted 12-Nov-2024 14:28:05  Mariya Jorgensen

## 2024-11-12 NOTE — ASU DISCHARGE PLAN (ADULT/PEDIATRIC) - CARE PROVIDER_API CALL
Mariya Jorgensen and [facility]  [address] [place] [address]  Phone: [phone]  Fax: [phone]  Follow Up Time:

## 2024-11-12 NOTE — ASU DISCHARGE PLAN (ADULT/PEDIATRIC) - ASU DC SPECIAL INSTRUCTIONSFT
600mg ibuprofen every 6 hours, 325-975mg Tylenol every 6 hours as needed.  For optimal pain control alternate ibuprofen and Tylenol every 3 hours.      Take Miralax BID x 7 days, Simethicone every 4 hours for gas pain.

## 2024-11-12 NOTE — ASU DISCHARGE PLAN (ADULT/PEDIATRIC) - ASU DISCHARGE DATE/TIME
12-Nov-2024 17:34 High Dose Vitamin A Counseling: Side effects reviewed, pt to contact office should one occur.

## 2024-11-12 NOTE — ASU DISCHARGE PLAN (ADULT/PEDIATRIC) - CALL YOUR DOCTOR IF YOU HAVE ANY OF THE FOLLOWING:
Bleeding that does not stop/Pain not relieved by Medications/Fever greater than (need to indicate Fahrenheit or Celsius) no dysuria, no frequency, and no hematuria.

## 2024-11-12 NOTE — CHART NOTE - NSCHARTNOTEFT_GEN_A_CORE
PACU ANESTHESIA ADMISSION NOTE      Procedure: Robotic hysterectomy BSO    ____  Intubated  TV:______       Rate: ______      FiO2: ______    ___x_  Patent Airway    __x__  Full return of protective reflexes    __x__  Full recovery from anesthesia / back to baseline     Vitals:   T:   97.5      R: 14            BP: 118/70          Sat:  100                P: 84      Mental Status:  ___x_ Awake   _____ Alert   _____ Drowsy   _____ Sedated    Nausea/Vomiting:  __x__ NO  ______Yes,   See Post - Op Orders          Pain Scale (0-10):  _0____    Treatment: ____ None    ____ See Post - Op/PCA Orders    Post - Operative Fluids:   ____ Oral   ___x_ See Post - Op Orders    Plan: Discharge:   __x__Home       _____Floor     _____Critical Care    _____  Other:_________________    Comments: Uneventful intraoperative course. No anesthesia issues or complications noted. Patient stable upon arrival to PACU. Report given to RN. Discharge when criteria met.

## 2024-11-12 NOTE — ASU DISCHARGE PLAN (ADULT/PEDIATRIC) - FINANCIAL ASSISTANCE
[facility] provides services at a reduced cost to those who are determined to be eligible through [facility]’s financial assistance program. Information regarding [facility]’s financial assistance program can be found by going to [email or web address] or by calling [phone].

## 2024-11-13 ENCOUNTER — NON-APPOINTMENT (OUTPATIENT)
Age: 69
End: 2024-11-13

## 2024-11-15 LAB — SURGICAL PATHOLOGY STUDY: SIGNIFICANT CHANGE UP

## 2024-11-19 DIAGNOSIS — Z79.82 LONG TERM (CURRENT) USE OF ASPIRIN: ICD-10-CM

## 2024-11-19 DIAGNOSIS — E03.9 HYPOTHYROIDISM, UNSPECIFIED: ICD-10-CM

## 2024-11-19 DIAGNOSIS — C50.911 MALIGNANT NEOPLASM OF UNSPECIFIED SITE OF RIGHT FEMALE BREAST: ICD-10-CM

## 2024-11-19 DIAGNOSIS — E78.5 HYPERLIPIDEMIA, UNSPECIFIED: ICD-10-CM

## 2024-11-19 DIAGNOSIS — N88.8 OTHER SPECIFIED NONINFLAMMATORY DISORDERS OF CERVIX UTERI: ICD-10-CM

## 2024-11-19 DIAGNOSIS — N81.3 COMPLETE UTEROVAGINAL PROLAPSE: ICD-10-CM

## 2024-11-19 DIAGNOSIS — N85.8 OTHER SPECIFIED NONINFLAMMATORY DISORDERS OF UTERUS: ICD-10-CM

## 2024-11-19 DIAGNOSIS — Z88.2 ALLERGY STATUS TO SULFONAMIDES: ICD-10-CM

## 2024-11-19 DIAGNOSIS — I10 ESSENTIAL (PRIMARY) HYPERTENSION: ICD-10-CM

## 2024-11-19 DIAGNOSIS — Z92.21 PERSONAL HISTORY OF ANTINEOPLASTIC CHEMOTHERAPY: ICD-10-CM

## 2024-11-19 DIAGNOSIS — N80.03 ADENOMYOSIS OF THE UTERUS: ICD-10-CM

## 2024-11-25 ENCOUNTER — APPOINTMENT (OUTPATIENT)
Dept: UROGYNECOLOGY | Facility: CLINIC | Age: 69
End: 2024-11-25

## 2024-11-25 VITALS
HEIGHT: 66 IN | BODY MASS INDEX: 22.18 KG/M2 | DIASTOLIC BLOOD PRESSURE: 63 MMHG | SYSTOLIC BLOOD PRESSURE: 112 MMHG | WEIGHT: 138 LBS | HEART RATE: 87 BPM

## 2024-11-25 DIAGNOSIS — R33.9 RETENTION OF URINE, UNSPECIFIED: ICD-10-CM

## 2024-11-25 DIAGNOSIS — N81.3 COMPLETE UTEROVAGINAL PROLAPSE: ICD-10-CM

## 2024-11-25 PROCEDURE — 51701 INSERT BLADDER CATHETER: CPT | Mod: 58

## 2024-11-25 PROCEDURE — 99024 POSTOP FOLLOW-UP VISIT: CPT

## 2024-11-30 ENCOUNTER — INPATIENT (INPATIENT)
Facility: HOSPITAL | Age: 69
LOS: 2 days | Discharge: HOME | DRG: 390 | End: 2024-12-03
Attending: SURGERY | Admitting: SURGERY
Payer: MEDICARE

## 2024-11-30 VITALS
SYSTOLIC BLOOD PRESSURE: 100 MMHG | HEART RATE: 92 BPM | TEMPERATURE: 98 F | HEIGHT: 66 IN | DIASTOLIC BLOOD PRESSURE: 67 MMHG | WEIGHT: 138.01 LBS | RESPIRATION RATE: 18 BRPM | OXYGEN SATURATION: 97 %

## 2024-11-30 DIAGNOSIS — Z98.890 OTHER SPECIFIED POSTPROCEDURAL STATES: Chronic | ICD-10-CM

## 2024-11-30 DIAGNOSIS — K56.609 UNSPECIFIED INTESTINAL OBSTRUCTION, UNSPECIFIED AS TO PARTIAL VERSUS COMPLETE OBSTRUCTION: ICD-10-CM

## 2024-11-30 DIAGNOSIS — Z90.11 ACQUIRED ABSENCE OF RIGHT BREAST AND NIPPLE: Chronic | ICD-10-CM

## 2024-11-30 PROBLEM — N81.4 UTEROVAGINAL PROLAPSE, UNSPECIFIED: Chronic | Status: ACTIVE | Noted: 2024-10-29

## 2024-11-30 PROBLEM — E03.9 HYPOTHYROIDISM, UNSPECIFIED: Chronic | Status: ACTIVE | Noted: 2024-10-29

## 2024-11-30 PROBLEM — C50.911 MALIGNANT NEOPLASM OF UNSPECIFIED SITE OF RIGHT FEMALE BREAST: Chronic | Status: ACTIVE | Noted: 2024-10-29

## 2024-11-30 PROBLEM — Z51.11 ENCOUNTER FOR ANTINEOPLASTIC CHEMOTHERAPY: Chronic | Status: ACTIVE | Noted: 2024-10-29

## 2024-11-30 PROBLEM — I10 ESSENTIAL (PRIMARY) HYPERTENSION: Chronic | Status: ACTIVE | Noted: 2024-10-29

## 2024-11-30 PROBLEM — E78.5 HYPERLIPIDEMIA, UNSPECIFIED: Chronic | Status: ACTIVE | Noted: 2024-10-29

## 2024-11-30 LAB
ALBUMIN SERPL ELPH-MCNC: 4.2 G/DL — SIGNIFICANT CHANGE UP (ref 3.5–5.2)
ALP SERPL-CCNC: 95 U/L — SIGNIFICANT CHANGE UP (ref 30–115)
ALT FLD-CCNC: 17 U/L — SIGNIFICANT CHANGE UP (ref 0–41)
ANION GAP SERPL CALC-SCNC: 14 MMOL/L — SIGNIFICANT CHANGE UP (ref 7–14)
AST SERPL-CCNC: 24 U/L — SIGNIFICANT CHANGE UP (ref 0–41)
BASOPHILS # BLD AUTO: 0.04 K/UL — SIGNIFICANT CHANGE UP (ref 0–0.2)
BASOPHILS NFR BLD AUTO: 0.2 % — SIGNIFICANT CHANGE UP (ref 0–1)
BILIRUB SERPL-MCNC: 0.3 MG/DL — SIGNIFICANT CHANGE UP (ref 0.2–1.2)
BUN SERPL-MCNC: 22 MG/DL — HIGH (ref 10–20)
CALCIUM SERPL-MCNC: 10.7 MG/DL — HIGH (ref 8.4–10.4)
CHLORIDE SERPL-SCNC: 97 MMOL/L — LOW (ref 98–110)
CK SERPL-CCNC: 41 U/L — SIGNIFICANT CHANGE UP (ref 0–225)
CO2 SERPL-SCNC: 29 MMOL/L — SIGNIFICANT CHANGE UP (ref 17–32)
CREAT SERPL-MCNC: 0.9 MG/DL — SIGNIFICANT CHANGE UP (ref 0.7–1.5)
EGFR: 68 ML/MIN/1.73M2 — SIGNIFICANT CHANGE UP
EOSINOPHIL # BLD AUTO: 0.01 K/UL — SIGNIFICANT CHANGE UP (ref 0–0.7)
EOSINOPHIL NFR BLD AUTO: 0.1 % — SIGNIFICANT CHANGE UP (ref 0–8)
GAS PNL BLDV: SIGNIFICANT CHANGE UP
GLUCOSE SERPL-MCNC: 122 MG/DL — HIGH (ref 70–99)
HCT VFR BLD CALC: 43.7 % — SIGNIFICANT CHANGE UP (ref 37–47)
HGB BLD-MCNC: 14.9 G/DL — SIGNIFICANT CHANGE UP (ref 12–16)
IMM GRANULOCYTES NFR BLD AUTO: 0.3 % — SIGNIFICANT CHANGE UP (ref 0.1–0.3)
LYMPHOCYTES # BLD AUTO: 1.1 K/UL — LOW (ref 1.2–3.4)
LYMPHOCYTES # BLD AUTO: 6.2 % — LOW (ref 20.5–51.1)
MAGNESIUM SERPL-MCNC: 2.5 MG/DL — HIGH (ref 1.8–2.4)
MCHC RBC-ENTMCNC: 31.7 PG — HIGH (ref 27–31)
MCHC RBC-ENTMCNC: 34.1 G/DL — SIGNIFICANT CHANGE UP (ref 32–37)
MCV RBC AUTO: 93 FL — SIGNIFICANT CHANGE UP (ref 81–99)
MONOCYTES # BLD AUTO: 1.07 K/UL — HIGH (ref 0.1–0.6)
MONOCYTES NFR BLD AUTO: 6 % — SIGNIFICANT CHANGE UP (ref 1.7–9.3)
NEUTROPHILS # BLD AUTO: 15.59 K/UL — HIGH (ref 1.4–6.5)
NEUTROPHILS NFR BLD AUTO: 87.2 % — HIGH (ref 42.2–75.2)
NRBC # BLD: 0 /100 WBCS — SIGNIFICANT CHANGE UP (ref 0–0)
PLATELET # BLD AUTO: 410 K/UL — HIGH (ref 130–400)
PMV BLD: 11.8 FL — HIGH (ref 7.4–10.4)
POTASSIUM SERPL-MCNC: 4.4 MMOL/L — SIGNIFICANT CHANGE UP (ref 3.5–5)
POTASSIUM SERPL-SCNC: 4.4 MMOL/L — SIGNIFICANT CHANGE UP (ref 3.5–5)
PROT SERPL-MCNC: 6.8 G/DL — SIGNIFICANT CHANGE UP (ref 6–8)
RBC # BLD: 4.7 M/UL — SIGNIFICANT CHANGE UP (ref 4.2–5.4)
RBC # FLD: 13.2 % — SIGNIFICANT CHANGE UP (ref 11.5–14.5)
SODIUM SERPL-SCNC: 140 MMOL/L — SIGNIFICANT CHANGE UP (ref 135–146)
TROPONIN T, HIGH SENSITIVITY RESULT: 10 NG/L — SIGNIFICANT CHANGE UP (ref 6–13)
TROPONIN T, HIGH SENSITIVITY RESULT: 15 NG/L — HIGH (ref 6–13)
WBC # BLD: 17.87 K/UL — HIGH (ref 4.8–10.8)
WBC # FLD AUTO: 17.87 K/UL — HIGH (ref 4.8–10.8)

## 2024-11-30 PROCEDURE — 85027 COMPLETE CBC AUTOMATED: CPT

## 2024-11-30 PROCEDURE — 93306 TTE W/DOPPLER COMPLETE: CPT

## 2024-11-30 PROCEDURE — 84100 ASSAY OF PHOSPHORUS: CPT

## 2024-11-30 PROCEDURE — 36415 COLL VENOUS BLD VENIPUNCTURE: CPT

## 2024-11-30 PROCEDURE — 71045 X-RAY EXAM CHEST 1 VIEW: CPT | Mod: 26,77

## 2024-11-30 PROCEDURE — 85025 COMPLETE CBC W/AUTO DIFF WBC: CPT

## 2024-11-30 PROCEDURE — 70450 CT HEAD/BRAIN W/O DYE: CPT | Mod: 26,MC

## 2024-11-30 PROCEDURE — 80048 BASIC METABOLIC PNL TOTAL CA: CPT

## 2024-11-30 PROCEDURE — 99285 EMERGENCY DEPT VISIT HI MDM: CPT | Mod: FS

## 2024-11-30 PROCEDURE — 71045 X-RAY EXAM CHEST 1 VIEW: CPT

## 2024-11-30 PROCEDURE — 71045 X-RAY EXAM CHEST 1 VIEW: CPT | Mod: 26

## 2024-11-30 PROCEDURE — 83735 ASSAY OF MAGNESIUM: CPT

## 2024-11-30 PROCEDURE — 93005 ELECTROCARDIOGRAM TRACING: CPT

## 2024-11-30 PROCEDURE — 74177 CT ABD & PELVIS W/CONTRAST: CPT | Mod: 26,MC

## 2024-11-30 PROCEDURE — 74018 RADEX ABDOMEN 1 VIEW: CPT

## 2024-11-30 PROCEDURE — 85730 THROMBOPLASTIN TIME PARTIAL: CPT

## 2024-11-30 PROCEDURE — 85610 PROTHROMBIN TIME: CPT

## 2024-11-30 PROCEDURE — 82553 CREATINE MB FRACTION: CPT

## 2024-11-30 PROCEDURE — 84484 ASSAY OF TROPONIN QUANT: CPT

## 2024-11-30 RX ORDER — ANASTROZOLE 1 MG/1
1 TABLET, COATED ORAL DAILY
Refills: 0 | Status: DISCONTINUED | OUTPATIENT
Start: 2024-11-30 | End: 2024-12-01

## 2024-11-30 RX ORDER — ROSUVASTATIN CALCIUM 5 MG/1
5 TABLET, FILM COATED ORAL AT BEDTIME
Refills: 0 | Status: DISCONTINUED | OUTPATIENT
Start: 2024-11-30 | End: 2024-12-01

## 2024-11-30 RX ORDER — AMLODIPINE BESYLATE 10 MG/1
2.5 TABLET ORAL AT BEDTIME
Refills: 0 | Status: DISCONTINUED | OUTPATIENT
Start: 2024-11-30 | End: 2024-12-01

## 2024-11-30 RX ORDER — ENOXAPARIN SODIUM 30 MG/.3ML
40 INJECTION SUBCUTANEOUS EVERY 24 HOURS
Refills: 0 | Status: DISCONTINUED | OUTPATIENT
Start: 2024-11-30 | End: 2024-12-03

## 2024-11-30 RX ORDER — ONDANSETRON HYDROCHLORIDE 4 MG/1
4 TABLET, FILM COATED ORAL ONCE
Refills: 0 | Status: COMPLETED | OUTPATIENT
Start: 2024-11-30 | End: 2024-11-30

## 2024-11-30 RX ORDER — 0.9 % SODIUM CHLORIDE 0.9 %
1500 INTRAVENOUS SOLUTION INTRAVENOUS ONCE
Refills: 0 | Status: COMPLETED | OUTPATIENT
Start: 2024-11-30 | End: 2024-11-30

## 2024-11-30 RX ORDER — ACETAMINOPHEN 500MG 500 MG/1
1000 TABLET, COATED ORAL ONCE
Refills: 0 | Status: COMPLETED | OUTPATIENT
Start: 2024-11-30 | End: 2024-12-01

## 2024-11-30 RX ORDER — BENZOCAINE 10 %
1 OINTMENT (GRAM) TOPICAL EVERY 6 HOURS
Refills: 0 | Status: DISCONTINUED | OUTPATIENT
Start: 2024-11-30 | End: 2024-12-03

## 2024-11-30 RX ORDER — LEVOTHYROXINE SODIUM 150 MCG
25 TABLET ORAL AT BEDTIME
Refills: 0 | Status: DISCONTINUED | OUTPATIENT
Start: 2024-11-30 | End: 2024-12-03

## 2024-11-30 RX ORDER — SODIUM CHLORIDE 9 MG/ML
1000 INJECTION, SOLUTION INTRAMUSCULAR; INTRAVENOUS; SUBCUTANEOUS ONCE
Refills: 0 | Status: COMPLETED | OUTPATIENT
Start: 2024-11-30 | End: 2024-11-30

## 2024-11-30 RX ORDER — 0.9 % SODIUM CHLORIDE 0.9 %
1000 INTRAVENOUS SOLUTION INTRAVENOUS
Refills: 0 | Status: DISCONTINUED | OUTPATIENT
Start: 2024-11-30 | End: 2024-12-03

## 2024-11-30 RX ORDER — CHLORHEXIDINE GLUCONATE 1.2 MG/ML
1 RINSE ORAL
Refills: 0 | Status: DISCONTINUED | OUTPATIENT
Start: 2024-11-30 | End: 2024-12-03

## 2024-11-30 RX ADMIN — SODIUM CHLORIDE 2000 MILLILITER(S): 9 INJECTION, SOLUTION INTRAMUSCULAR; INTRAVENOUS; SUBCUTANEOUS at 14:35

## 2024-11-30 RX ADMIN — Medication 1000 MILLILITER(S): at 21:47

## 2024-11-30 RX ADMIN — ROSUVASTATIN CALCIUM 5 MILLIGRAM(S): 5 TABLET, FILM COATED ORAL at 23:57

## 2024-11-30 RX ADMIN — ENOXAPARIN SODIUM 40 MILLIGRAM(S): 30 INJECTION SUBCUTANEOUS at 23:15

## 2024-11-30 RX ADMIN — AMLODIPINE BESYLATE 2.5 MILLIGRAM(S): 10 TABLET ORAL at 23:56

## 2024-11-30 RX ADMIN — ONDANSETRON HYDROCHLORIDE 4 MILLIGRAM(S): 4 TABLET, FILM COATED ORAL at 14:35

## 2024-11-30 RX ADMIN — Medication 25 MICROGRAM(S): at 23:40

## 2024-11-30 NOTE — H&P ADULT - NSHPPHYSICALEXAM_GEN_ALL_CORE
General: NAD  HEENT: NCAT, erythema over nasal ridge no tenderness no crepitus  Cards: RRR  Pulm: no increased work of breathing  Abd: soft, nondistended, minimally tender suprapubic no rebound/guarding  Ext: warm and well-perfused, no edema  Psych: appropriate affect

## 2024-11-30 NOTE — ED ADULT TRIAGE NOTE - ESI TRIAGE ACUITY LEVEL, MLM
Etiology unclear  no s/s of tamponade  will repeat echo today to assess for pseudoaneurysm  continue Indocin for now
3

## 2024-11-30 NOTE — H&P ADULT - ASSESSMENT
69F pmh of HTN, HLD, hypothyroidism, left breast IDC s/p mastectomy with left sentinel lymph node biopsy s/p endocrine therapy no chemotherapy, uterovaginal prolapse s/p laparoscopic TAHBSO with sacrocolpopexy 11/12/24 presents with 1 day of nausea and vomiting. Patient had no complications after her procedure and was recovering well tolerating regular diet having gas and bowel movements until last night when she awoke from sleep with abdominal fullness and nausea. She vomited 3x bilious material and has had decreased PO intake. This morning she syncopized twice, and her son found her unconscious on the living room floor. Upon arrival to the ED, HD stable, afebrile, CTH negative for acute abnormality, CTAP revealing dilated stomach and loops of SB with transition in the pelvis with fluid in the pelvis and inflamed loops of bowel. Denies fevers/chills, chest pain, sob, cough, dark/bloody stools, vaginal drainage, dysuria. Last bowel movement very small this morning, no gas in 24 hrs.    Plan    NPO  NGT to LCS--> 300 on insertion  LR @ maintenance  Restart home meds when able  Lovenox for DVT PPX  Serial abdominal examinations  Monitor WBC and for fever, start abx if needed  No surgical intervention for now will attempt medical management  Contrast study 12/1 after decompression    5207 69F pmh of HTN, HLD, hypothyroidism, right breast IDC s/p mastectomy with right sentinel lymph node biopsy s/p endocrine therapy no chemotherapy, uterovaginal prolapse s/p laparoscopic TAHBSO with sacrocolpopexy 11/12/24 presents with 1 day of nausea and vomiting. Patient had no complications after her procedure and was recovering well tolerating regular diet having gas and bowel movements until last night when she awoke from sleep with abdominal fullness and nausea. She vomited 3x bilious material and has had decreased PO intake. This morning she syncopized twice, and her son found her unconscious on the living room floor. Upon arrival to the ED, HD stable, afebrile, CTH negative for acute abnormality, CTAP revealing dilated stomach and loops of SB with transition in the pelvis with fluid in the pelvis and inflamed loops of bowel. Denies fevers/chills, chest pain, sob, cough, dark/bloody stools, vaginal drainage, dysuria. Last bowel movement very small this morning, no gas in 24 hrs.    Plan    NPO  NGT to LCS--> 300 on insertion  LR @ maintenance  Restart home meds when able  Lovenox for DVT PPX  Serial abdominal examinations  Monitor WBC and for fever, start abx if needed  No surgical intervention for now will attempt medical management  Contrast study 12/1 after decompression    0099

## 2024-11-30 NOTE — H&P ADULT - ATTENDING COMMENTS
ACS Attending Note Attestation    Patient is examined and evaluated at the bedside with the residents/PAs. Treatment plan discussed with the team, nurses, and consulting physicians and consulting teams. Medications, radiological studies and all other relevant studies reviewed. I reviewed the resident/PA note and agreed with above assessment and plan with following additions and corrections.    (30 Nov 2024 19:52)    Physical Exam:  Radiological studies reviewed by me with following noted:  Allergies    Sulfacetamide Sodium (Short breath; Rash)    Intolerances      PAST MEDICAL & SURGICAL HISTORY:  HTN (hypertension)      HLD (hyperlipidemia)      Cancer of right breast      Hypothyroidism      Prolapsed uterus      Maintenance chemotherapy      History of right mastectomy      S/P breast reconstruction, right                                    14.9   17.87 )-----------( 410      ( 30 Nov 2024 14:33 )             43.7     11-30    140  |  97[L]  |  22[H]  ----------------------------<  122[H]  4.4   |  29  |  0.9    Ca    10.7[H]      30 Nov 2024 14:33  Mg     2.5     11-30    TPro  6.8  /  Alb  4.2  /  TBili  0.3  /  DBili  x   /  AST  24  /  ALT  17  /  AlkPhos  95  11-30          69F pmh of HTN, HLD, hypothyroidism, left breast IDC s/p mastectomy with left sentinel lymph node biopsy s/p endocrine therapy no chemotherapy, uterovaginal prolapse s/p laparoscopic TAHBSO with sacrocolpopexy 11/12/24 presents with 1 day of nausea and vomiting. Patient had no complications after her procedure and was recovering well tolerating regular diet having gas and bowel movements until last night when she awoke from sleep with abdominal fullness and nausea. She vomited 3x bilious material and has had decreased PO intake. This morning she syncopized twice, and her son found her unconscious on the living room floor. Upon arrival to the ED, HD stable, afebrile, CTH negative for acute abnormality, CTAP revealing dilated stomach and loops of SB with transition in the pelvis with fluid in the pelvis and inflamed loops of bowel. Denies fevers/chills, chest pain, sob, cough, dark/bloody stools, vaginal drainage, dysuria. Last bowel movement very small this morning, no gas in 24 hrs.    Plan    NPO  NGT to LCS--> 300 on insertion  LR @ maintenance  Restart home meds when able  Lovenox for DVT PPX  Serial abdominal examinations  Monitor WBC and for fever, start abx if needed  No surgical intervention for now will attempt medical management  Contrast study 12/1 after decompression      Rubén Washington MD, FACS  Trauma/ACS/Surcical Critical care Attending

## 2024-11-30 NOTE — ED PROVIDER NOTE - DIFFERENTIAL DIAGNOSIS
Weakness, abdominal pain, syncope.    Abdominal pain r/o UTI/pyelo, kidney stone, obstruction, perforation, pancreatitis, abscess, appendicitis, ischemia, hepatobiliary abnormality or any other emergent intra-abdominal pathology.    Also assess for underlying etiology of syncope including cardiac vs infectious etiology Differential Diagnosis

## 2024-11-30 NOTE — H&P ADULT - NSHPLABSRESULTS_GEN_ALL_CORE
.  LABS:                         14.9   17.87 )-----------( 410      ( 30 Nov 2024 14:33 )             43.7     11-30    140  |  97[L]  |  22[H]  ----------------------------<  122[H]  4.4   |  29  |  0.9    Ca    10.7[H]      30 Nov 2024 14:33  Mg     2.5     11-30    TPro  6.8  /  Alb  4.2  /  TBili  0.3  /  DBili  x   /  AST  24  /  ALT  17  /  AlkPhos  95  11-30      Urinalysis Basic - ( 30 Nov 2024 14:33 )    Color: x / Appearance: x / SG: x / pH: x  Gluc: 122 mg/dL / Ketone: x  / Bili: x / Urobili: x   Blood: x / Protein: x / Nitrite: x   Leuk Esterase: x / RBC: x / WBC x   Sq Epi: x / Non Sq Epi: x / Bacteria: x            RADIOLOGY, EKG & ADDITIONAL TESTS: Reviewed.

## 2024-11-30 NOTE — ED PROVIDER NOTE - CLINICAL SUMMARY MEDICAL DECISION MAKING FREE TEXT BOX
Patient presented with generalized weakness as well as 2 episodes of syncope today.  Patient states that she has been having abdominal pain and nausea have also began earlier today.  Tender on abdominal exam but otherwise afebrile, hemodynamically stable, neurovascularly intact.  EKG was obtained and nonspecific, but no evidence of STEMI.  Obtained labs which showed leukocytosis to around 17,000 as well as elevated troponin but otherwise were grossly unremarkable including no significant anemia, signs of dehydration/LIANE, transaminitis or significant electrolyte abnormalities.  Chest xray negative for pneumothorax, pneumonia, widened mediastinum, evidence of rib fractures, enlarged cardiac silhouette or any other emergent pathologies.  CT of the head negative for acute traumatic injury or emergent intracranial pathologies.  CT of the abdomen pelvis however showed an SBO as documented.  Surgery was consulted and evaluated the patient in the ED.  Per surgery, admit to their service for further management.  Patient agreeable with plan.  Hemodynamically stable at time of admission.

## 2024-11-30 NOTE — ED ADULT TRIAGE NOTE - CHIEF COMPLAINT QUOTE
pt presents to er. recent hx of pelvic floor reconstruction 2 weeks ago. pt c/o 4 episodes of weakness today twice pt syncopized. was found on floor at about 10am. pt c/o nausea and weakness. pt has left ac 18 g accesss from ems

## 2024-11-30 NOTE — ED PROVIDER NOTE - PHYSICAL EXAMINATION
CONSTITUTIONAL: in no apparent distress.   HEAD: Normocephalic; atraumatic.   EYES: Pupils are round and reactive, extra-ocular muscles are intact. Eyelids are normal in appearance without swelling or lesions.   ENT: Hearing is intact with good acuity to spoken voice.  Patient is speaking clearly, not muffled and airway is intact.   RESPIRATORY: No signs of respiratory distress. Lung sounds are clear in all lobes bilaterally without rales, rhonchi, or wheezes.  CARDIOVASCULAR: Regular rate and rhythm.   GI: Abdomen is soft, non-tender, and without distention. Bowel sounds are present and normoactive in all four quadrants. No masses are noted.   NEURO: A & O x 3. Normal speech. Visual fields are full to confrontation. Pupils are equally reactive to light. Extraocular movement is intact. There is no eye deviation. Facial sensation is intact to light touch. Face is symmetric with normal eye closure and smile. Hearing is normal to spoken voice. Phonation is normal. No upper or lower extremity drift.   PSYCHOLOGICAL: Appropriate mood and affect. Good judgement and insight.

## 2024-11-30 NOTE — ED PROVIDER NOTE - PROGRESS NOTE DETAILS
Labs unremarkable.  CT shows small bowel obstruction.  Spoke with surgery team who evaluated patient and requested patient be admitted to their service.  Spoke with OB/GYN who will also follow the patient.  Patient is aware of the plan and agrees.

## 2024-11-30 NOTE — ED PROVIDER NOTE - CARE PLAN
1 Principal Discharge DX:	SBO (small bowel obstruction)  Secondary Diagnosis:	Syncope  Secondary Diagnosis:	Elevated troponin

## 2024-11-30 NOTE — ED PROVIDER NOTE - OBJECTIVE STATEMENT
69-year-old female with no significant past medical history who presents to the ED for evaluation.  Reports that she has been having weakness earlier today and had syncope episode twice.  Also endorses nausea, but denies vomiting.  Denies fever, shortness of breath, chest pain, vomiting, abdominal pain, urinary symptoms, and change with bowel movement.

## 2024-11-30 NOTE — H&P ADULT - HISTORY OF PRESENT ILLNESS
69F pmh of HTN, HLD, hypothyroidism, left breast IDC s/p mastectomy with left sentinel lymph node biopsy s/p endocrine therapy no chemotherapy, uterovaginal prolapse s/p laparoscopic TAHBSO with sacrocolpopexy 11/12/24 presents with 1 day of nausea and vomiting. Patient had no complications after her procedure and was recovering well tolerating regular diet having gas and bowel movements until last night when she awoke from sleep with abdominal fullness and nausea. She vomited 3x bilious material and has had decreased PO intake. This morning she syncopized twice, and her son found her unconscious on the living room floor. Upon arrival to the ED, HD stable, afebrile, CTH negative for acute abnormality, CTAP revealing dilated stomach and loops of SB with transition in the pelvis with fluid in the pelvis and inflamed loops of bowel. Denies fevers/chills, chest pain, sob, cough, dark/bloody stools, vaginal drainage, dysuria. Last bowel movement very small this morning, no gas in 24 hrs. 69F pmh of HTN, HLD, hypothyroidism, right breast IDC s/p mastectomy with right sentinel lymph node biopsy s/p endocrine therapy no chemotherapy, uterovaginal prolapse s/p laparoscopic TAHBSO with sacrocolpopexy 11/12/24 presents with 1 day of nausea and vomiting. Patient had no complications after her procedure and was recovering well tolerating regular diet having gas and bowel movements until last night when she awoke from sleep with abdominal fullness and nausea. She vomited 3x bilious material and has had decreased PO intake. This morning she syncopized twice, and her son found her unconscious on the living room floor. Upon arrival to the ED, HD stable, afebrile, CTH negative for acute abnormality, CTAP revealing dilated stomach and loops of SB with transition in the pelvis with fluid in the pelvis and inflamed loops of bowel. Denies fevers/chills, chest pain, sob, cough, dark/bloody stools, vaginal drainage, dysuria. Last bowel movement very small this morning, no gas in 24 hrs.

## 2024-11-30 NOTE — CONSULT NOTE ADULT - ASSESSMENT
A/P: 70 yo postmenopausal PMHx HTN, DLD, hypothyroidism, h/o breast cancer s/p right mastectomy (2016) on anastrazole s/p robotic hysterectomy BSO and sacral colpopexy 11/12 with small bowel obstruction, currently hemodynamically stable.     - monitor vitals  - recommend surgery consult  - pain management PRN   - GYN ([id number]) to follow  A/P: 70 yo postmenopausal PMHx HTN, DLD, hypothyroidism, h/o breast cancer s/p right mastectomy (2016) on anastrazole s/p robotic hysterectomy BSO and sacral colpopexy 11/12 with small bowel obstruction, currently hemodynamically stable.     - monitor vitals, pain management PRN   - appreciate management of SBO per surgery team  - GYN ([id number]) to follow  A/P: 68 yo postmenopausal PMHx HTN, DLD, hypothyroidism, h/o breast cancer s/p right mastectomy (2016) on anastrazole s/p robotic hysterectomy BSO and sacral colpopexy 11/12 CT A/P diagnostic for small bowel obstruction, currently hemodynamically stable.     - CT Head neg for intracranial pathology  - monitor vitals, pain management PRN   - appreciate management of SBO per surgery team  - GYN ([id number]) to follow

## 2024-12-01 LAB
ANION GAP SERPL CALC-SCNC: 10 MMOL/L — SIGNIFICANT CHANGE UP (ref 7–14)
APTT BLD: 39.9 SEC — HIGH (ref 27–39.2)
BASOPHILS # BLD AUTO: 0.04 K/UL — SIGNIFICANT CHANGE UP (ref 0–0.2)
BASOPHILS NFR BLD AUTO: 0.4 % — SIGNIFICANT CHANGE UP (ref 0–1)
BUN SERPL-MCNC: 14 MG/DL — SIGNIFICANT CHANGE UP (ref 10–20)
CALCIUM SERPL-MCNC: 9 MG/DL — SIGNIFICANT CHANGE UP (ref 8.4–10.5)
CHLORIDE SERPL-SCNC: 106 MMOL/L — SIGNIFICANT CHANGE UP (ref 98–110)
CK MB CFR SERPL CALC: 3 NG/ML — SIGNIFICANT CHANGE UP (ref 0.6–6.3)
CO2 SERPL-SCNC: 28 MMOL/L — SIGNIFICANT CHANGE UP (ref 17–32)
CREAT SERPL-MCNC: 0.7 MG/DL — SIGNIFICANT CHANGE UP (ref 0.7–1.5)
EGFR: 92 ML/MIN/1.73M2 — SIGNIFICANT CHANGE UP
EOSINOPHIL # BLD AUTO: 0.45 K/UL — SIGNIFICANT CHANGE UP (ref 0–0.7)
EOSINOPHIL NFR BLD AUTO: 4.3 % — SIGNIFICANT CHANGE UP (ref 0–8)
GLUCOSE SERPL-MCNC: 86 MG/DL — SIGNIFICANT CHANGE UP (ref 70–99)
HCT VFR BLD CALC: 38.6 % — SIGNIFICANT CHANGE UP (ref 37–47)
HGB BLD-MCNC: 13.1 G/DL — SIGNIFICANT CHANGE UP (ref 12–16)
IMM GRANULOCYTES NFR BLD AUTO: 0.3 % — SIGNIFICANT CHANGE UP (ref 0.1–0.3)
INR BLD: 0.97 RATIO — SIGNIFICANT CHANGE UP (ref 0.65–1.3)
LYMPHOCYTES # BLD AUTO: 2.31 K/UL — SIGNIFICANT CHANGE UP (ref 1.2–3.4)
LYMPHOCYTES # BLD AUTO: 21.9 % — SIGNIFICANT CHANGE UP (ref 20.5–51.1)
MAGNESIUM SERPL-MCNC: 2.3 MG/DL — SIGNIFICANT CHANGE UP (ref 1.8–2.4)
MCHC RBC-ENTMCNC: 31.6 PG — HIGH (ref 27–31)
MCHC RBC-ENTMCNC: 33.9 G/DL — SIGNIFICANT CHANGE UP (ref 32–37)
MCV RBC AUTO: 93 FL — SIGNIFICANT CHANGE UP (ref 81–99)
MONOCYTES # BLD AUTO: 0.88 K/UL — HIGH (ref 0.1–0.6)
MONOCYTES NFR BLD AUTO: 8.4 % — SIGNIFICANT CHANGE UP (ref 1.7–9.3)
NEUTROPHILS # BLD AUTO: 6.82 K/UL — HIGH (ref 1.4–6.5)
NEUTROPHILS NFR BLD AUTO: 64.7 % — SIGNIFICANT CHANGE UP (ref 42.2–75.2)
NRBC # BLD: 0 /100 WBCS — SIGNIFICANT CHANGE UP (ref 0–0)
PHOSPHATE SERPL-MCNC: 3 MG/DL — SIGNIFICANT CHANGE UP (ref 2.1–4.9)
PLATELET # BLD AUTO: 371 K/UL — SIGNIFICANT CHANGE UP (ref 130–400)
PMV BLD: 11.7 FL — HIGH (ref 7.4–10.4)
POTASSIUM SERPL-MCNC: 3.7 MMOL/L — SIGNIFICANT CHANGE UP (ref 3.5–5)
POTASSIUM SERPL-SCNC: 3.7 MMOL/L — SIGNIFICANT CHANGE UP (ref 3.5–5)
PROTHROM AB SERPL-ACNC: 11.4 SEC — SIGNIFICANT CHANGE UP (ref 9.95–12.87)
RBC # BLD: 4.15 M/UL — LOW (ref 4.2–5.4)
RBC # FLD: 13.5 % — SIGNIFICANT CHANGE UP (ref 11.5–14.5)
SODIUM SERPL-SCNC: 144 MMOL/L — SIGNIFICANT CHANGE UP (ref 135–146)
TROPONIN T, HIGH SENSITIVITY RESULT: 16 NG/L — HIGH (ref 6–13)
TROPONIN T, HIGH SENSITIVITY RESULT: 20 NG/L — HIGH (ref 6–13)
WBC # BLD: 10.53 K/UL — SIGNIFICANT CHANGE UP (ref 4.8–10.8)
WBC # FLD AUTO: 10.53 K/UL — SIGNIFICANT CHANGE UP (ref 4.8–10.8)

## 2024-12-01 PROCEDURE — 71045 X-RAY EXAM CHEST 1 VIEW: CPT | Mod: 26,76

## 2024-12-01 PROCEDURE — 93010 ELECTROCARDIOGRAM REPORT: CPT

## 2024-12-01 RX ADMIN — CHLORHEXIDINE GLUCONATE 1 APPLICATION(S): 1.2 RINSE ORAL at 05:06

## 2024-12-01 RX ADMIN — ACETAMINOPHEN 500MG 400 MILLIGRAM(S): 500 TABLET, COATED ORAL at 20:25

## 2024-12-01 RX ADMIN — ENOXAPARIN SODIUM 40 MILLIGRAM(S): 30 INJECTION SUBCUTANEOUS at 23:56

## 2024-12-01 RX ADMIN — Medication 100 MILLILITER(S): at 12:38

## 2024-12-01 RX ADMIN — Medication 1 SPRAY(S): at 10:10

## 2024-12-01 RX ADMIN — ACETAMINOPHEN 500MG 1000 MILLIGRAM(S): 500 TABLET, COATED ORAL at 22:17

## 2024-12-01 RX ADMIN — Medication 25 MICROGRAM(S): at 21:20

## 2024-12-01 NOTE — PATIENT PROFILE ADULT - FALL HARM RISK - UNIVERSAL INTERVENTIONS
Bed in lowest position, wheels locked, appropriate side rails in place/Call bell, personal items and telephone in reach/Instruct patient to call for assistance before getting out of bed or chair/Non-slip footwear when patient is out of bed/[place] to call system/Physically safe environment - no spills, clutter or unnecessary equipment/Purposeful Proactive Rounding/Room/bathroom lighting operational, light cord in reach

## 2024-12-02 ENCOUNTER — RESULT REVIEW (OUTPATIENT)
Age: 69
End: 2024-12-02

## 2024-12-02 LAB
ANION GAP SERPL CALC-SCNC: 13 MMOL/L — SIGNIFICANT CHANGE UP (ref 7–14)
ANION GAP SERPL CALC-SCNC: 16 MMOL/L — HIGH (ref 7–14)
BASOPHILS # BLD AUTO: 0.04 K/UL — SIGNIFICANT CHANGE UP (ref 0–0.2)
BASOPHILS NFR BLD AUTO: 0.3 % — SIGNIFICANT CHANGE UP (ref 0–1)
BUN SERPL-MCNC: 10 MG/DL — SIGNIFICANT CHANGE UP (ref 10–20)
BUN SERPL-MCNC: 9 MG/DL — LOW (ref 10–20)
CALCIUM SERPL-MCNC: 9 MG/DL — SIGNIFICANT CHANGE UP (ref 8.4–10.5)
CALCIUM SERPL-MCNC: 9.3 MG/DL — SIGNIFICANT CHANGE UP (ref 8.4–10.5)
CHLORIDE SERPL-SCNC: 102 MMOL/L — SIGNIFICANT CHANGE UP (ref 98–110)
CHLORIDE SERPL-SCNC: 103 MMOL/L — SIGNIFICANT CHANGE UP (ref 98–110)
CO2 SERPL-SCNC: 23 MMOL/L — SIGNIFICANT CHANGE UP (ref 17–32)
CO2 SERPL-SCNC: 26 MMOL/L — SIGNIFICANT CHANGE UP (ref 17–32)
CREAT SERPL-MCNC: 0.5 MG/DL — LOW (ref 0.7–1.5)
CREAT SERPL-MCNC: 0.6 MG/DL — LOW (ref 0.7–1.5)
EGFR: 100 ML/MIN/1.73M2 — SIGNIFICANT CHANGE UP
EGFR: 96 ML/MIN/1.73M2 — SIGNIFICANT CHANGE UP
EOSINOPHIL # BLD AUTO: 0.12 K/UL — SIGNIFICANT CHANGE UP (ref 0–0.7)
EOSINOPHIL NFR BLD AUTO: 1 % — SIGNIFICANT CHANGE UP (ref 0–8)
GLUCOSE SERPL-MCNC: 68 MG/DL — LOW (ref 70–99)
GLUCOSE SERPL-MCNC: 94 MG/DL — SIGNIFICANT CHANGE UP (ref 70–99)
HCT VFR BLD CALC: 36.7 % — LOW (ref 37–47)
HCT VFR BLD CALC: 38.4 % — SIGNIFICANT CHANGE UP (ref 37–47)
HGB BLD-MCNC: 12.3 G/DL — SIGNIFICANT CHANGE UP (ref 12–16)
HGB BLD-MCNC: 12.7 G/DL — SIGNIFICANT CHANGE UP (ref 12–16)
IMM GRANULOCYTES NFR BLD AUTO: 0.3 % — SIGNIFICANT CHANGE UP (ref 0.1–0.3)
LYMPHOCYTES # BLD AUTO: 1.09 K/UL — LOW (ref 1.2–3.4)
LYMPHOCYTES # BLD AUTO: 8.9 % — LOW (ref 20.5–51.1)
MAGNESIUM SERPL-MCNC: 2 MG/DL — SIGNIFICANT CHANGE UP (ref 1.8–2.4)
MAGNESIUM SERPL-MCNC: 2.2 MG/DL — SIGNIFICANT CHANGE UP (ref 1.8–2.4)
MCHC RBC-ENTMCNC: 31 PG — SIGNIFICANT CHANGE UP (ref 27–31)
MCHC RBC-ENTMCNC: 31.3 PG — HIGH (ref 27–31)
MCHC RBC-ENTMCNC: 33.1 G/DL — SIGNIFICANT CHANGE UP (ref 32–37)
MCHC RBC-ENTMCNC: 33.5 G/DL — SIGNIFICANT CHANGE UP (ref 32–37)
MCV RBC AUTO: 93.4 FL — SIGNIFICANT CHANGE UP (ref 81–99)
MCV RBC AUTO: 93.7 FL — SIGNIFICANT CHANGE UP (ref 81–99)
MONOCYTES # BLD AUTO: 0.88 K/UL — HIGH (ref 0.1–0.6)
MONOCYTES NFR BLD AUTO: 7.2 % — SIGNIFICANT CHANGE UP (ref 1.7–9.3)
NEUTROPHILS # BLD AUTO: 10.12 K/UL — HIGH (ref 1.4–6.5)
NEUTROPHILS NFR BLD AUTO: 82.3 % — HIGH (ref 42.2–75.2)
NRBC # BLD: 0 /100 WBCS — SIGNIFICANT CHANGE UP (ref 0–0)
NRBC # BLD: 0 /100 WBCS — SIGNIFICANT CHANGE UP (ref 0–0)
PHOSPHATE SERPL-MCNC: 2.3 MG/DL — SIGNIFICANT CHANGE UP (ref 2.1–4.9)
PHOSPHATE SERPL-MCNC: 2.7 MG/DL — SIGNIFICANT CHANGE UP (ref 2.1–4.9)
PLATELET # BLD AUTO: 300 K/UL — SIGNIFICANT CHANGE UP (ref 130–400)
PLATELET # BLD AUTO: 349 K/UL — SIGNIFICANT CHANGE UP (ref 130–400)
PMV BLD: 11.6 FL — HIGH (ref 7.4–10.4)
PMV BLD: 11.7 FL — HIGH (ref 7.4–10.4)
POTASSIUM SERPL-MCNC: 3.4 MMOL/L — LOW (ref 3.5–5)
POTASSIUM SERPL-MCNC: 4.3 MMOL/L — SIGNIFICANT CHANGE UP (ref 3.5–5)
POTASSIUM SERPL-SCNC: 3.4 MMOL/L — LOW (ref 3.5–5)
POTASSIUM SERPL-SCNC: 4.3 MMOL/L — SIGNIFICANT CHANGE UP (ref 3.5–5)
RBC # BLD: 3.93 M/UL — LOW (ref 4.2–5.4)
RBC # BLD: 4.1 M/UL — LOW (ref 4.2–5.4)
RBC # FLD: 13.2 % — SIGNIFICANT CHANGE UP (ref 11.5–14.5)
RBC # FLD: 13.2 % — SIGNIFICANT CHANGE UP (ref 11.5–14.5)
SODIUM SERPL-SCNC: 141 MMOL/L — SIGNIFICANT CHANGE UP (ref 135–146)
SODIUM SERPL-SCNC: 142 MMOL/L — SIGNIFICANT CHANGE UP (ref 135–146)
WBC # BLD: 10.22 K/UL — SIGNIFICANT CHANGE UP (ref 4.8–10.8)
WBC # BLD: 12.29 K/UL — HIGH (ref 4.8–10.8)
WBC # FLD AUTO: 10.22 K/UL — SIGNIFICANT CHANGE UP (ref 4.8–10.8)
WBC # FLD AUTO: 12.29 K/UL — HIGH (ref 4.8–10.8)

## 2024-12-02 PROCEDURE — 71045 X-RAY EXAM CHEST 1 VIEW: CPT | Mod: 26

## 2024-12-02 PROCEDURE — 74018 RADEX ABDOMEN 1 VIEW: CPT | Mod: 26

## 2024-12-02 PROCEDURE — 99232 SBSQ HOSP IP/OBS MODERATE 35: CPT | Mod: 24

## 2024-12-02 PROCEDURE — 93306 TTE W/DOPPLER COMPLETE: CPT | Mod: 26

## 2024-12-02 PROCEDURE — 99232 SBSQ HOSP IP/OBS MODERATE 35: CPT

## 2024-12-02 RX ORDER — POTASSIUM PHOSPHATE, MONOBASIC POTASSIUM PHOSPHATE, DIBASIC INJECTION, 236; 224 MG/ML; MG/ML
15 SOLUTION, CONCENTRATE INTRAVENOUS ONCE
Refills: 0 | Status: COMPLETED | OUTPATIENT
Start: 2024-12-02 | End: 2024-12-02

## 2024-12-02 RX ORDER — POTASSIUM CHLORIDE 600 MG/1
20 TABLET, EXTENDED RELEASE ORAL ONCE
Refills: 0 | Status: COMPLETED | OUTPATIENT
Start: 2024-12-02 | End: 2024-12-02

## 2024-12-02 RX ORDER — IOHEXOL 300 MG/ML
30 INJECTION, SOLUTION INTRAVENOUS ONCE
Refills: 0 | Status: COMPLETED | OUTPATIENT
Start: 2024-12-02 | End: 2024-12-02

## 2024-12-02 RX ORDER — ACETAMINOPHEN 500MG 500 MG/1
1000 TABLET, COATED ORAL ONCE
Refills: 0 | Status: COMPLETED | OUTPATIENT
Start: 2024-12-02 | End: 2024-12-02

## 2024-12-02 RX ORDER — ONDANSETRON HYDROCHLORIDE 4 MG/1
4 TABLET, FILM COATED ORAL EVERY 8 HOURS
Refills: 0 | Status: DISCONTINUED | OUTPATIENT
Start: 2024-12-02 | End: 2024-12-03

## 2024-12-02 RX ORDER — 0.9 % SODIUM CHLORIDE 0.9 %
1000 INTRAVENOUS SOLUTION INTRAVENOUS
Refills: 0 | Status: DISCONTINUED | OUTPATIENT
Start: 2024-12-02 | End: 2024-12-03

## 2024-12-02 RX ADMIN — Medication 100 MILLILITER(S): at 13:27

## 2024-12-02 RX ADMIN — CHLORHEXIDINE GLUCONATE 1 APPLICATION(S): 1.2 RINSE ORAL at 06:27

## 2024-12-02 RX ADMIN — ONDANSETRON HYDROCHLORIDE 4 MILLIGRAM(S): 4 TABLET, FILM COATED ORAL at 07:57

## 2024-12-02 RX ADMIN — Medication 100 MILLILITER(S): at 00:25

## 2024-12-02 RX ADMIN — Medication 25 MICROGRAM(S): at 21:11

## 2024-12-02 RX ADMIN — ENOXAPARIN SODIUM 40 MILLIGRAM(S): 30 INJECTION SUBCUTANEOUS at 23:47

## 2024-12-02 RX ADMIN — IOHEXOL 30 MILLILITER(S): 300 INJECTION, SOLUTION INTRAVENOUS at 13:30

## 2024-12-02 RX ADMIN — ACETAMINOPHEN 500MG 400 MILLIGRAM(S): 500 TABLET, COATED ORAL at 15:34

## 2024-12-02 RX ADMIN — POTASSIUM CHLORIDE 50 MILLIEQUIVALENT(S): 600 TABLET, EXTENDED RELEASE ORAL at 11:19

## 2024-12-02 RX ADMIN — POTASSIUM PHOSPHATE, MONOBASIC POTASSIUM PHOSPHATE, DIBASIC INJECTION, 63.75 MILLIMOLE(S): 236; 224 SOLUTION, CONCENTRATE INTRAVENOUS at 07:20

## 2024-12-02 NOTE — PROGRESS NOTE ADULT - ATTENDING COMMENTS
Trauma/Acute Care Surgery Attending Note Attestation  Patient was seen and examined bedside.  I reviewed the resident/PA note and agreed with above assessment and plan with following additions and corrections.    Passed flatus but feels nauseous.    T(C): 36.6 (12-02-24 @ 20:16), Max: 37.3 (12-02-24 @ 04:51)  HR: 81 (12-02-24 @ 20:16) (81 - 94)  BP: 147/73 (12-02-24 @ 20:16) (128/74 - 147/73)  RR: 18 (12-02-24 @ 20:16) (18 - 18)  SpO2: 100% (12-02-24 @ 04:51) (100% - 100%)      12-01-24 @ 07:01  -  12-02-24 @ 07:00  --------------------------------------------------------  IN:    Lactated Ringers: 600 mL  Total IN: 600 mL    OUT:  Total OUT: 0 mL    Total NET: 600 mL      12-02-24 @ 07:01  -  12-02-24 @ 20:54  --------------------------------------------------------  IN:    dextrose 5% + sodium chloride 0.45%: 850 mL    IV PiggyBack: 350 mL  Total IN: 1200 mL    OUT:    Voided (mL): 720 mL  Total OUT: 720 mL    Total NET: 480 mL      I independently performed a medically appropriate exam. The exam was notable for minimal abdominal distention but no tenderness to palpation.                          12.3   12.29 )-----------( 349      ( 01 Dec 2024 22:56 )             36.7     12-01    142  |  103  |  10  ----------------------------<  68[L]  3.4[L]   |  23  |  0.6[L]    Ca 9.0; Mg 2.0; Phos 2.7    PT/INR/PTT - ( 01 Dec 2024 07:49 )   PT: 11.40 sec; INR: 0.97 ratio; PTT 39.9 sec      CXR reviewed and interpreted by me: no obvious pneumothorax    Assessment/Plan:  69y Female PMH HTN, HLD, hypothyroidism s/p recent TAHBSO admitted with small bowel obstruction  - Small bowel obstruction - contrast challenge today and repeat KUB 6 hours after contrast administration via NGT; change fluids to D5 1/2 NS @ 100cc/h  - Hypothyroidism - synthroid 25mcg QHS  - Hypokalemia - potassium chloride 20mEq IV given along with potassium phosphate 15mmol IVPB (also given to prevent hypophosphatemia)  - DVT ppx  Alvaro Penn MD  Trauma/Acute Care Surgery/Surgical Critical Care Attending

## 2024-12-02 NOTE — PROGRESS NOTE ADULT - ATTENDING COMMENTS
s/p RA TLH/BS0/ASC now with SB0, conservatively managed as per general surgery  Still having nausea today, improved with zofran  Leukocytosis worsened today  Still awaiting CT with oral contrast via NGT  Appreciate surgery input

## 2024-12-02 NOTE — CONSULT NOTE ADULT - ASSESSMENT
Syncope- likely vasovagal vs dehydration by history  HTN  DLD  hypothyroidism    - telemetry WNL  - echo pending  - check TSH  - SBO seems to be opening- management per surgical team  - if surgical management is indicated, may proceed at intermediate risk from a cardiac perspective.

## 2024-12-03 ENCOUNTER — TRANSCRIPTION ENCOUNTER (OUTPATIENT)
Age: 69
End: 2024-12-03

## 2024-12-03 VITALS
HEART RATE: 76 BPM | RESPIRATION RATE: 18 BRPM | SYSTOLIC BLOOD PRESSURE: 126 MMHG | TEMPERATURE: 99 F | DIASTOLIC BLOOD PRESSURE: 72 MMHG

## 2024-12-03 PROCEDURE — 99238 HOSP IP/OBS DSCHRG MGMT 30/<: CPT

## 2024-12-03 PROCEDURE — 99232 SBSQ HOSP IP/OBS MODERATE 35: CPT | Mod: 24

## 2024-12-03 RX ORDER — LEVOTHYROXINE SODIUM 150 MCG
50 TABLET ORAL DAILY
Refills: 0 | Status: DISCONTINUED | OUTPATIENT
Start: 2024-12-03 | End: 2024-12-03

## 2024-12-03 RX ORDER — ANASTROZOLE 1 MG/1
1 TABLET, COATED ORAL DAILY
Refills: 0 | Status: DISCONTINUED | OUTPATIENT
Start: 2024-12-03 | End: 2024-12-03

## 2024-12-03 RX ORDER — HEPARIN SODIUM 5000 [USP'U]/.5ML
30 INJECTION, SOLUTION INTRAVENOUS; SUBCUTANEOUS ONCE
Refills: 0 | Status: COMPLETED | OUTPATIENT
Start: 2024-12-03 | End: 2024-12-03

## 2024-12-03 RX ORDER — AMLODIPINE BESYLATE 10 MG/1
2.5 TABLET ORAL AT BEDTIME
Refills: 0 | Status: DISCONTINUED | OUTPATIENT
Start: 2024-12-03 | End: 2024-12-03

## 2024-12-03 RX ORDER — MAGNESIUM GLUCONATE 29.25 MG
250 TABLET ORAL DAILY
Refills: 0 | Status: DISCONTINUED | OUTPATIENT
Start: 2024-12-03 | End: 2024-12-03

## 2024-12-03 RX ORDER — ROSUVASTATIN CALCIUM 5 MG/1
5 TABLET, FILM COATED ORAL AT BEDTIME
Refills: 0 | Status: DISCONTINUED | OUTPATIENT
Start: 2024-12-03 | End: 2024-12-03

## 2024-12-03 RX ADMIN — CHLORHEXIDINE GLUCONATE 1 APPLICATION(S): 1.2 RINSE ORAL at 06:42

## 2024-12-03 RX ADMIN — Medication 100 MILLILITER(S): at 01:52

## 2024-12-03 RX ADMIN — HEPARIN SODIUM 85 MILLIMOLE(S): 5000 INJECTION, SOLUTION INTRAVENOUS; SUBCUTANEOUS at 02:16

## 2024-12-03 RX ADMIN — Medication 50 MICROGRAM(S): at 12:39

## 2024-12-03 NOTE — DISCHARGE NOTE PROVIDER - HOSPITAL COURSE
69F pmh of HTN, HLD, hypothyroidism, right breast IDC s/p mastectomy with right sentinel lymph node biopsy s/p endocrine therapy no chemotherapy, uterovaginal prolapse s/p laparoscopic TAHBSO with sacrocolpopexy 11/12/24 presents with 1 day of nausea and vomiting. Patient had no complications after her procedure and was recovering well tolerating regular diet having gas and bowel movements until last night when she awoke from sleep with abdominal fullness and nausea. She vomited 3x bilious material and has had decreased PO intake. This morning she syncopized twice, and her son found her unconscious on the living room floor. Upon arrival to the ED, HD stable, afebrile, CTH negative for acute abnormality, CTAP revealing dilated stomach and loops of SB with transition in the pelvis with fluid in the pelvis and inflamed loops of bowel. Denies fevers/chills, chest pain, sob, cough, dark/bloody stools, vaginal drainage, dysuria. Last bowel movement very small this morning, no gas in 24 hrs. Patient admitted to surgery for conservative management of SBO. NGT was placed for decompression and patient kept NPO until return of bowel function. Syncopal workup completed, orthostatics negative and ECHO with EF of 65 to 70%. Patient was passing gas, so gastrograffin challenge was done which shows contrast in the colon. NGT was removed and patient started on regular diet. She is hemodynamically stable and ready to be discharged home. Recommend follow up with GYN for routine post-op management.

## 2024-12-03 NOTE — PROGRESS NOTE ADULT - ATTENDING COMMENTS
I saw the patient this morning. Advised her of the plan to remove the NGT and advance diet as that the KUB last night showed the contrast going through the colon.  Plan as per general surgery.

## 2024-12-03 NOTE — DISCHARGE NOTE PROVIDER - CARE PROVIDER_API CALL
[facility]  [address] [place] [id number]  Phone: [phone]  Fax: [phone]  Follow Up Time: Routine

## 2024-12-03 NOTE — PROGRESS NOTE ADULT - SUBJECTIVE AND OBJECTIVE BOX
GENERAL SURGERY PROGRESS NOTE    Patient: JOSÉ MIGUEL MERINO , 69y ([birth year])Female   MRN: [id number]  Location: [address]  Visit: 11-30-24 Inpatient  Date: 12-02-24 @ 02:45    Hospital Day #: 3  Post-Op Day #: 10    Procedure/Dx/Injuries: SBO S/P NGT   11/22 - S/P LAP TAHBSO, SACROCOLPOPEXY    Events of past 24 hours: no acute events overnight, patient admits to passing gas    PAST MEDICAL & SURGICAL HISTORY:  HTN (hypertension)  HLD (hyperlipidemia)  Cancer of right breast  Hypothyroidism  Prolapsed uterus  Maintenance chemotherapy  History of right mastectomy  S/P breast reconstruction, right    Vitals:   T(F): 98.5 (12-01-24 @ 19:48), Max: 98.5 (12-01-24 @ 19:48)  HR: 92 (12-01-24 @ 19:48)  BP: 122/69 (12-01-24 @ 19:48)  RR: 18 (12-01-24 @ 19:48)  SpO2: 97% (12-01-24 @ 05:33)    Diet, NPO    Fluids:     I & O's:    11-30-24 @ 07:01  -  12-01-24 @ 07:00  --------------------------------------------------------  IN:  Total IN: 0 mL    OUT:    Voided (mL): 500 mL  Total OUT: 500 mL    Total NET: -500 mL    PHYSICAL EXAM:  General: NAD  HEENT: NCAT, erythema over nasal ridge no tenderness, NGT in place  Cards: RRR  Respiratory: Chest rise equal b/l, no labored breathing  Abd: soft, nondistended, minimally tender suprapubic no rebound/guarding  Ext: warm and well-perfused, no edema  Skin: Warm/dry, no color changes    MEDICATIONS  (STANDING):  chlorhexidine 2% Cloths 1 Application(s) Topical <User Schedule>  enoxaparin Injectable 40 milliGRAM(s) SubCutaneous every 24 hours  lactated ringers. 1000 milliLiter(s) (100 mL/Hr) IV Continuous <Continuous>  levothyroxine Injectable 25 MICROGram(s) IV Push at bedtime  potassium chloride  20 mEq/100 mL IVPB 20 milliEquivalent(s) IV Intermittent once  potassium phosphate IVPB 15 milliMole(s) IV Intermittent once    MEDICATIONS  (PRN):  benzocaine 20% Spray 1 Spray(s) Topical every 6 hours PRN throat discomfort    DVT PROPHYLAXIS: enoxaparin Injectable 40 milliGRAM(s) SubCutaneous every 24 hours    LAB/STUDIES:                        12.3   12.29 )-----------( 349      ( 01 Dec 2024 22:56 )             36.7       Auto Neutrophil %: 82.3 % (12-01-24 @ 22:56)  Auto Immature Granulocyte %: 0.3 % (12-01-24 @ 22:56)  Auto Neutrophil %: 64.7 % (12-01-24 @ 07:49)  Auto Immature Granulocyte %: 0.3 % (12-01-24 @ 07:49)    12-01    142  |  103  |  10  ----------------------------<  68[L]  3.4[L]   |  23  |  0.6[L]      Calcium: 9.0 mg/dL (12-01-24 @ 22:56)    LFTs:             6.8  | 0.3  | 24       ------------------[95      ( 30 Nov 2024 14:33 )  4.2  | x    | 17           Blood Gas Venous - Lactate: 2.1 mmol/L (11-30-24 @ 14:30)      Coags:     11.40  ----< 0.97    ( 01 Dec 2024 07:49 )     39.9        CARDIAC MARKERS ( 30 Nov 2024 23:50 )  x     / x     / x     / x     / 3.0 ng/mL    Urinalysis Basic - ( 01 Dec 2024 22:56 )    Color: x / Appearance: x / SG: x / pH: x  Gluc: 68 mg/dL / Ketone: x  / Bili: x / Urobili: x   Blood: x / Protein: x / Nitrite: x   Leuk Esterase: x / RBC: x / WBC x   Sq Epi: x / Non Sq Epi: x / Bacteria: x  
GENERAL SURGERY PROGRESS NOTE    Patient: JOSÉ MIGUEL MERINO , 69y ([birth year])Female   MRN: [id number]  Location: [address]  Visit: 11-30-24 Inpatient  Date: 12-03-24 @ 12:09    Hospital Day #: 4  Post-Op Day #: 11    Procedure/Dx/Injuries: SBO s/p NGT     Events of past 24 hours: KUB shows contrast in colon, NGT removed, advanced to regular diet    PAST MEDICAL & SURGICAL HISTORY:  HTN (hypertension)  HLD (hyperlipidemia)  Cancer of right breast  Hypothyroidism  Prolapsed uterus  Maintenance chemotherapy  History of right mastectomy  S/P breast reconstruction, right    Vitals:   T(F): 99.2 (12-03-24 @ 04:50), Max: 99.2 (12-03-24 @ 04:50)  HR: 75 (12-03-24 @ 04:50)  BP: 129/75 (12-03-24 @ 04:50)  RR: 18 (12-03-24 @ 04:50)  SpO2: 97% (12-03-24 @ 04:50)    Diet, Regular    Fluids: dextrose 5% + sodium chloride 0.45%.: Solution, 1000 milliLiter(s) infuse at 100 mL/Hr    I & O's:    12-02-24 @ 07:01  -  12-03-24 @ 07:00  --------------------------------------------------------  IN:    dextrose 5% + sodium chloride 0.45%: 850 mL    IV PiggyBack: 350 mL  Total IN: 1200 mL    OUT:    Voided (mL): 720 mL  Total OUT: 720 mL    Total NET: 480 mL    PHYSICAL EXAM:  General: NAD  HEENT: NCAT, scab over nasal bridge no tenderness  Cards: RRR  Respiratory: Chest rise equal b/l, no labored breathing  Abd: soft, nondistended, nontender  Ext: warm and well-perfused, no edema  Skin: Warm/dry, no color changes    MEDICATIONS  (STANDING):  chlorhexidine 2% Cloths 1 Application(s) Topical <User Schedule>  dextrose 5% + sodium chloride 0.45%. 1000 milliLiter(s) (100 mL/Hr) IV Continuous <Continuous>  enoxaparin Injectable 40 milliGRAM(s) SubCutaneous every 24 hours  levothyroxine Injectable 25 MICROGram(s) IV Push at bedtime    MEDICATIONS  (PRN):  benzocaine 20% Spray 1 Spray(s) Topical every 6 hours PRN throat discomfort  ondansetron Injectable 4 milliGRAM(s) IV Push every 8 hours PRN Nausea and/or Vomiting    DVT PROPHYLAXIS: enoxaparin Injectable 40 milliGRAM(s) SubCutaneous every 24 hours    LAB/STUDIES:                      12.7   10.22 )-----------( 300      ( 02 Dec 2024 21:06 )             38.4         12-02    141  |  102  |  9[L]  ----------------------------<  94  4.3   |  26  |  0.5[L]      Calcium: 9.3 mg/dL (12-02-24 @ 21:06)    LFTs:     Blood Gas Venous - Lactate: 2.1 mmol/L (11-30-24 @ 14:30)    Urinalysis Basic - ( 02 Dec 2024 21:06 )    Color: x / Appearance: x / SG: x / pH: x  Gluc: 94 mg/dL / Ketone: x  / Bili: x / Urobili: x   Blood: x / Protein: x / Nitrite: x   Leuk Esterase: x / RBC: x / WBC x   Sq Epi: x / Non Sq Epi: x / Bacteria: x  
PGY 3 Note    Patient examined at bedside, states her abdominal pain is improving.  Not passing flatus. Denies fevers, chills, nausea or vomiting.  Currently NPO. Ambulating and urinating.        T(F): 97.6 (12-01-24 @ 05:33), Max: 98.2 (11-30-24 @ 12:31)  HR: 94 (12-01-24 @ 07:36) (77 - 94)  BP: 108/66 (12-01-24 @ 07:36) (100/67 - 124/74)  RR: 18 (12-01-24 @ 05:33) (18 - 18)  SpO2: 97% (12-01-24 @ 05:33) (95% - 97%)      Physical Exam:  General: AAOx3. NAD  CVS: RRR. Nl S1S2  Lungs: CTAB  Abdomen: soft, non-tender, non-distended, +BSx4  Ext: No edema.     Labs:             13.1   10.53 )-----------( 371      ( 12-01 @ 07:49 )             38.6                14.9   17.87[H] )-----------( 410[H]    ( 11-30 @ 14:33 )             43.7      12-01    144  |  106  |  14  ----------------------------<  86  3.7   |  28  |  0.7    Ca    9.0      01 Dec 2024 07:49  Phos  3.0     12-01  Mg     2.3     12-01    TPro  6.8  /  Alb  4.2  /  TBili  0.3  /  DBili  x   /  AST  24  /  ALT  17  /  AlkPhos  95  11-30            Trend:             13.1   10.53 )-----------( 371      ( 12-01 @ 07:49 )             38.6                14.9   17.87[H] )-----------( 410[H]    ( 11-30 @ 14:33 )             43.7         Creatinine: 0.7 (12-01)  Creatinine: 0.9 (11-30)      Medications:  MEDICATIONS  (STANDING):  amLODIPine   Tablet 2.5 milliGRAM(s) Oral at bedtime  anastrozole 1 milliGRAM(s) Oral daily  chlorhexidine 2% Cloths 1 Application(s) Topical <User Schedule>  enoxaparin Injectable 40 milliGRAM(s) SubCutaneous every 24 hours  lactated ringers. 1000 milliLiter(s) (100 mL/Hr) IV Continuous <Continuous>  levothyroxine Injectable 25 MICROGram(s) IV Push at bedtime  rosuvastatin 5 milliGRAM(s) Oral at bedtime    MEDICATIONS  (PRN):  acetaminophen   IVPB .. 1000 milliGRAM(s) IV Intermittent once PRN Temp greater or equal to 38C (100.4F), Mild Pain (1 - 3)  benzocaine 20% Spray 1 Spray(s) Topical every 6 hours PRN throat discomfort          
PGY3 Progress Note    Subjective  Patient seen at bedside. Reports passing flatus overnight once. Denies nausea, emesis. Denies having a bowel movement. Reports she feels much better when she is ambulating. Voiding spontaneously. Denies fever, chills, SOB, chest pain, palpitations, abdominal pain.    Objective  Vital Signs Last 24 Hrs  T(C): 37.3 (03 Dec 2024 04:50), Max: 37.3 (03 Dec 2024 04:50)  T(F): 99.2 (03 Dec 2024 04:50), Max: 99.2 (03 Dec 2024 04:50)  HR: 75 (03 Dec 2024 04:50) (75 - 90)  BP: 129/75 (03 Dec 2024 04:50) (128/74 - 147/73)  BP(mean): 93 (03 Dec 2024 04:50) (93 - 93)  RR: 18 (03 Dec 2024 04:50) (18 - 18)  SpO2: 97% (03 Dec 2024 04:50) (97% - 97%)    Physical Exam  Gen: AAOx3, NAD, NG tube in place  Heart: RRR, no m/r/g  Lung: CTAB, NC in place  Abdomen: soft, nontender, nondistended, no rebound, no guarding, bowel sounds present  Ext: no edema or erythema in bilateral upper and lower extremities    MEDICATIONS  (STANDING):  chlorhexidine 2% Cloths 1 Application(s) Topical <User Schedule>  dextrose 5% + sodium chloride 0.45%. 1000 milliLiter(s) (100 mL/Hr) IV Continuous <Continuous>  enoxaparin Injectable 40 milliGRAM(s) SubCutaneous every 24 hours  lactated ringers. 1000 milliLiter(s) (100 mL/Hr) IV Continuous <Continuous>  levothyroxine Injectable 25 MICROGram(s) IV Push at bedtime    MEDICATIONS  (PRN):  benzocaine 20% Spray 1 Spray(s) Topical every 6 hours PRN throat discomfort  ondansetron Injectable 4 milliGRAM(s) IV Push every 8 hours PRN Nausea and/or Vomiting    Labs                        12.7   10.22 )-----------( 300      ( 02 Dec 2024 21:06 )             38.4   12-    141  |  102  |  9[L]  ----------------------------<  94  4.3   |  26  |  0.5[L]    Ca    9.3      02 Dec 2024 21:06  Phos  2.3     12-  Mg     2.2         Imaging  < from: TTE Echo Complete w/o Contrast w/ Doppler (24 @ 08:44) >    ACC: [id number] EXAM:  ECHO TTE WO CON COMP W DOPP                          PROCEDURE DATE:  2024          INTERPRETATION:   [place] [address]                Phone: [phone].   TRANSTHORACIC ECHOCARDIOGRAM REPORT        Patient Name:   JOSÉ MIGUEL MERINO Accession #: [id number]  Medical Rec #:  [id number]      Height:      66.0 in 167.6 cm  YOB: 1955      Weight:      138.0 lb 62.60 kg  Patient Age:    69 years       BSA:         1.71 m²  Patient Gender: F              BP:          130/68 mmHg      Date of Exam:        2024 8:44:02 AM  Referring Physician: Padmini Montana  Sonographer:         Jeanie Gill  Fellow:              Huy Garcia  Reading Physician:   Katie Brand MD, [facility].    Procedure:     2D Echo/Doppler/Color Doppler Complete.  Indications:   R55 - Syncope and Collapse  Diagnosis:     R55 - Syncope and collapse  Study Details: Technically difficult study.        Summary:   1. Left ventricular ejection fraction, by visual estimation, is 65 to   70%.   2. Normal left ventricular size and wall thicknesses, with normal   systolic and diastolic function.      PHYSICIAN INTERPRETATION:  LeftVentricle: Normal left ventricular size and wall thicknesses, with   normal systolic and diastolic function. Left ventricular ejection   fraction, by visual estimation, is 65 to 70%.  Right Ventricle: Normal right ventricular size and function.  LeftAtrium: Normal left atrial size.  Right Atrium: Normal right atrial size.  Pericardium: No significant pericardial effusion visualized on limited   views.  Mitral Valve: Grossly normal mitral valve with normal leaflet excursion.   No evidence of mitral stenosis. No mitral regurgitation visualized.  Tricuspid Valve: Grossly normal tricuspid valve with normal leaflet   excursion. No tricuspid regurgitation visualized. Adequate TR velocity   was not obtained to accurately assess RVSP.  Aortic Valve:The aortic valve was not well visualized; grossly normal   opening. No evidence of aortic stenosis. No aortic regurgitation   visualized.  Pulmonic Valve: The pulmonic valve was not well visualized.  Aorta: Aortic root measured at Sinus of Valsalva is normal.      2D AND M-MODE MEASUREMENTS (normal ranges within parentheses):  Left Ventricle:                  Normal   Aorta/Left Atrium:            Normal  IVSd (2D):              0.90 cm (0.7-1.1) Aortic Root (2D):  2.30 cm   (2.4-3.7)  LVPWd (2D):             0.90 cm (0.7-1.1)  LVIDd (2D):             4.50 cm (3.4-5.7)  LVIDs (2D):             2.80 cm  LV FS (2D):             37.8 %   (>25%)  Relative Wall Thickness  0.40    (<0.42)    SPECTRAL DOPPLER ANALYSIS:  LV DIASTOLIC FUNCTION:  MV Peak E: 1.07 m/s Decel Time: 202 msec  MV Peak A: 0.94 m/s  E/A Ratio: 1.14    Aortic Valve:  AoV VMax:    1.48 m/s AoV Area, Vmax: 2.49 cm² Vmax Indx: 1.46 cm²/m²  AoV Pk Grad: 8.8 mmHg    LVOT Vmax: 1.45 m/s  LVOT VTI:  0.28 m  LVOT Diam: 1.80 cm    Mitral Valve:  MV P1/2 Time: 58.58 msec  MV Area, PHT: 3.76 cm²    Pulmonic Valve:  PV Max Velocity: 1.33 m/s PV Max P.1 mmHg PV Mean P Katie Brand MD, [facility], Electronically signed on   2024 at 11:01:41 AM            *** Final ***    < end of copied text >  
PGY3 Progress Note    Subjective  Patient seen at bedside. Reports some nausea this morning, denies emesis. Passed flatus yesterday, last at 2000. Denies having a bowel movement. Reports she feels much better when she is ambulating. Voiding spontaneously. Denies fever, chills, SOB, chest pain, palpitations, abdominal pain.    Objective  Vital Signs Last 24 Hrs  T(C): 37.3 (02 Dec 2024 04:51), Max: 37.3 (02 Dec 2024 04:51)  T(F): 99.2 (02 Dec 2024 04:51), Max: 99.2 (02 Dec 2024 04:51)  HR: 94 (02 Dec 2024 04:51) (81 - 94)  BP: 130/68 (02 Dec 2024 04:51) (106/62 - 130/68)  BP(mean): 89 (02 Dec 2024 04:51) (89 - 89)  RR: 18 (02 Dec 2024 04:51) (18 - 18)  SpO2: 100% (02 Dec 2024 04:51) (100% - 100%)    Physical Exam  Gen: AAOx3, NAD, NG tube in place  Heart: RRR, no m/r/g  Lung: CTAB, NC in place  Abdomen: soft, nontender, nondistended, no rebound, no guarding, bowel sounds present  Ext: no edema or erythema in bilateral upper and lower extremities    MEDICATIONS  (STANDING):  chlorhexidine 2% Cloths 1 Application(s) Topical <User Schedule>  enoxaparin Injectable 40 milliGRAM(s) SubCutaneous every 24 hours  lactated ringers. 1000 milliLiter(s) (100 mL/Hr) IV Continuous <Continuous>  levothyroxine Injectable 25 MICROGram(s) IV Push at bedtime  potassium chloride  20 mEq/100 mL IVPB 20 milliEquivalent(s) IV Intermittent once  potassium phosphate IVPB 15 milliMole(s) IV Intermittent once    MEDICATIONS  (PRN):  benzocaine 20% Spray 1 Spray(s) Topical every 6 hours PRN throat discomfort    Labs                        12.3   12.29 )-----------( 349      ( 01 Dec 2024 22:56 )             36.7   12-01    142  |  103  |  10  ----------------------------<  68[L]  3.4[L]   |  23  |  0.6[L]    Ca    9.0      01 Dec 2024 22:56  Phos  2.7     12-01  Mg     2.0     12-01    TPro  6.8  /  Alb  4.2  /  TBili  0.3  /  DBili  x   /  AST  24  /  ALT  17  /  AlkPhos  95  11-30  Troponin T, High Sensitivity Result: 20: A single cardiac troponin result is not definitive in diagnosing myocardial infarction. Serial measurements are required in suspected NSTEMI. Elevations are notable in patients with renal impairment, rhabdomyolysis and polymyositis. Hemolysis may cause falsely low results. Elevated troponin that is decreasing in serial measurements may be indicative of resolving ischemia. ng/L (12.01.24 @ 07:49)  Imaging  < from: CT Abdomen and Pelvis w/ IV Cont (11.30.24 @ 15:34) >  ACC: [id number] EXAM:  CT ABDOMEN AND PELVIS IC   ORDERED BY: BRIAN GARDNER     PROCEDURE DATE:  11/30/2024          INTERPRETATION:  CLINICAL INFORMATION: Abdominal pain    COMPARISON: None.    CONTRAST/COMPLICATIONS:  IV Contrast: Omnipaque 350  95 cc administered   5 cc discarded  Oral Contrast: NONE  The liver spleen pancreas kidneys and adrenal glands appear normal.  The gallbladder shows no inflammatory changes.  There is no free fluid or lymphadenopathy.  There is an inflammatory process in the mid pelvis associated with what   appear to be matted thick-walled small bowel loops and associated with   moderate fluid distention of multiple loops of small bowel. These   findings are consistent with a small bowel obstruction etiology not   apparent on this exam. The colon is normal in caliber with no   inflammatory changes seen. There is no free air. The SMV SMA and MARY JO are   patent.  The L3 vertebral body contains a sclerotic lesion suspicious for   malignant process, Perhaps metastatic disease. The soft tissues appear   intact.    IMPRESSION: Small bowel obstruction which appears to be secondary to an   inflammatory process in the mid pelvis etiology not apparent in this exam.  Bone lesion L3 perhaps representing metastatic disease              --- End of Report ---            NORA GARCIA MD; Attending Radiologist  This document has been electronically signed. Nov 30 2024  4:18PM    < end of copied text >  < from: Xray Chest 1 View- PORTABLE-Routine (Xray Chest 1 View- PORTABLE-Routine in AM.) (12.02.24 @ 04:12) >    ACC: [id number] EXAM:  XR CHEST PORTABLE ROUTINE 1V   ORDERED BY: FELIPE RODRIGUEZ     PROCEDURE DATE:  12/02/2024          INTERPRETATION:  CLINICAL HISTORY: Left pneumothorax    COMPARISON: December 1, 2024 time 2:11 PM    TECHNIQUE: Portable frontal chest radiograph.    FINDINGS:    Support devices: NG tube at the duodenal bulb.    Cardiac/mediastinum/hilum: Heart size within normal limits, thoracic   aortic calcification.    Lung parenchyma/Pleura: No focal parenchymal opacities, pleural effusions   or pneumothorax    Skeleton/soft tissues: Stable bony structures. Right axillary surgical   clips.      IMPRESSION:    No radiographic evidence of acute cardiopulmonary disease. No   pneumothorax.    --- End of Report ---            JORDIN MALONE MD; Attending Radiologist  This document has been electronically signed. Dec  2 2024  5:17AM    < end of copied text >  
GENERAL SURGERY PROGRESS NOTE    Patient: JOSÉ MIGUEL MERINO , 69y ([birth year])Female   MRN: [id number]  Location: [address]  Visit: 11-30-24 Inpatient  Date: 12-01-24 @ 09:48    Events of past 24 hours: JACINTA    PAST MEDICAL & SURGICAL HISTORY:  HTN (hypertension)      HLD (hyperlipidemia)      Cancer of right breast      Hypothyroidism      Prolapsed uterus      Maintenance chemotherapy      History of right mastectomy      S/P breast reconstruction, right          Vitals:   T(F): 97.6 (12-01-24 @ 05:33), Max: 98.2 (11-30-24 @ 12:31)  HR: 94 (12-01-24 @ 07:36)  BP: 108/66 (12-01-24 @ 07:36)  RR: 18 (12-01-24 @ 05:33)  SpO2: 97% (12-01-24 @ 05:33)      Diet, NPO      Fluids: lactated ringers.: Solution, 1000 milliLiter(s) infuse at 100 mL/Hr      I & O's:    Physical Exam: General: NAD  HEENT: NCAT, erythema over nasal ridge no tenderness no crepitus  Cards: RRR  Pulm: no increased work of breathing  Abd: soft, nondistended, minimally tender suprapubic no rebound/guarding  Ext: warm and well-perfused, no edema  Psych: appropriate affect    MEDICATIONS  (STANDING):  amLODIPine   Tablet 2.5 milliGRAM(s) Oral at bedtime  anastrozole 1 milliGRAM(s) Oral daily  chlorhexidine 2% Cloths 1 Application(s) Topical <User Schedule>  enoxaparin Injectable 40 milliGRAM(s) SubCutaneous every 24 hours  lactated ringers. 1000 milliLiter(s) (100 mL/Hr) IV Continuous <Continuous>  levothyroxine Injectable 25 MICROGram(s) IV Push at bedtime  rosuvastatin 5 milliGRAM(s) Oral at bedtime    MEDICATIONS  (PRN):  acetaminophen   IVPB .. 1000 milliGRAM(s) IV Intermittent once PRN Temp greater or equal to 38C (100.4F), Mild Pain (1 - 3)  benzocaine 20% Spray 1 Spray(s) Topical every 6 hours PRN throat discomfort      DVT PROPHYLAXIS: enoxaparin Injectable 40 milliGRAM(s) SubCutaneous every 24 hours    GI PROPHYLAXIS:   ANTICOAGULATION:   ANTIBIOTICS:            LAB/STUDIES:  Labs:  CAPILLARY BLOOD GLUCOSE                              13.1   10.53 )-----------( 371      ( 01 Dec 2024 07:49 )             38.6       Auto Neutrophil %: 64.7 % (12-01-24 @ 07:49)  Auto Immature Granulocyte %: 0.3 % (12-01-24 @ 07:49)  Auto Neutrophil %: 87.2 % (11-30-24 @ 14:33)  Auto Immature Granulocyte %: 0.3 % (11-30-24 @ 14:33)    11-30    140  |  97[L]  |  22[H]  ----------------------------<  122[H]  4.4   |  29  |  0.9      Calcium: 10.7 mg/dL (11-30-24 @ 14:33)      LFTs:             6.8  | 0.3  | 24       ------------------[95      ( 30 Nov 2024 14:33 )  4.2  | x    | 17          Lipase:x      Amylase:x         Blood Gas Venous - Lactate: 2.1 mmol/L (11-30-24 @ 14:30)      Coags:     11.40  ----< 0.97    ( 01 Dec 2024 07:49 )     39.9        CARDIAC MARKERS ( 30 Nov 2024 23:50 )  x     / x     / x     / x     / 3.0 ng/mL          Urinalysis Basic - ( 30 Nov 2024 14:33 )    Color: x / Appearance: x / SG: x / pH: x  Gluc: 122 mg/dL / Ketone: x  / Bili: x / Urobili: x   Blood: x / Protein: x / Nitrite: x   Leuk Esterase: x / RBC: x / WBC x   Sq Epi: x / Non Sq Epi: x / Bacteria: x

## 2024-12-03 NOTE — DISCHARGE NOTE PROVIDER - NSDCMRMEDTOKEN_GEN_ALL_CORE_FT
anastrozole 1 mg oral tablet: 1 tab(s) orally once a day  calcium (as carbonate)-vitamin D 600 mg-800 intl units (20 mcg) oral tablet, chewable: 1 tab(s) chewed once a day  Crestor 5 mg oral tablet: 1 tab(s) orally once a day (at bedtime)  magnesium gluconate 250 mg oral tablet: 1 tab(s) orally once a day  Nasonex 24hr Allergy 50 mcg/inh nasal spray: 2 spray(s) in each nostril once a day as needed for  allergy symptoms  Norvasc 2.5 mg oral tablet: 1 tab(s) orally once a day (at bedtime)  Synthroid 50 mcg (0.05 mg) oral tablet: 1 tab(s) orally once a day

## 2024-12-03 NOTE — DISCHARGE NOTE NURSING/CASE MANAGEMENT/SOCIAL WORK - PATIENT PORTAL LINK FT
You can access the FollowMyHealth Patient Portal offered by [facility] by registering at the following website: [email or web address]. By joining [employer]’s FollowMyHealth portal, you will also be able to view your health information using other applications (apps) compatible with our system.

## 2024-12-03 NOTE — DISCHARGE NOTE NURSING/CASE MANAGEMENT/SOCIAL WORK - FINANCIAL ASSISTANCE
[facility] provides services at a reduced cost to those who are determined to be eligible through [facility]’s financial assistance program. Information regarding [facility]’s financial assistance program can be found by going to [email or web address] or by calling [phone].

## 2024-12-03 NOTE — PROGRESS NOTE ADULT - ATTENDING COMMENTS
Trauma/Acute Care Surgery Attending Note Attestation  Patient was seen and examined bedside.  I reviewed the resident/PA note and agreed with above assessment and plan with following additions and corrections.    Passing flatus. No nausea. Abdomen feels better. KUB reviewed and interpreted by me as having contrast in transverse colon.    SBO resolved. NGT removed. Start regular diet. If tolerating diet and able to have BM, can discharge home. Plan to follow up with gynecology for post-operative visit. No need to follow up with surgery for SBO.    Alvaro Penn MD  Trauma/Acute Care Surgery/Surgical Critical Care Attending Trauma/Acute Care Surgery Attending Note Attestation  Patient was seen and examined bedside.  I reviewed the resident/PA note and agreed with above assessment and plan with following additions and corrections.    Passing flatus. No nausea. Abdomen feels better. KUB reviewed and interpreted by me as having contrast in transverse colon.    I performed a medically appropriate exam. Exam notable for soft, not tender, not distended abdomen.    SBO resolved. NGT removed. Start regular diet. If tolerating diet and able to have BM, can discharge home. Plan to follow up with gynecology for post-operative visit. No need to follow up with surgery for SBO.    Alvaro Penn MD  Trauma/Acute Care Surgery/Surgical Critical Care Attending

## 2024-12-03 NOTE — PROGRESS NOTE ADULT - ASSESSMENT
69F pmh of HTN, HLD, hypothyroidism, left breast IDC s/p mastectomy with left sentinel lymph node biopsy s/p endocrine therapy no chemotherapy, uterovaginal prolapse s/p laparoscopic TAHBSO with sacrocolpopexy 11/12/24 presents with 1 day of nausea and vomiting, CTAP findings show SBO. Physical exam, labs, and imaging documented above.     PLAN:   - Concern for small pneumothorax on last CXR, will monitor and follow AM CXR  - Currently on nasal cannula and encourage IS 10x/hr  - NPO w/ IVF  - NGT to LCWS  - DVT ppx  - Restart home meds when able  - Serial abdominal exams  - Continue with conservative management for now  - Monitor bowel function  - Monitor labs, replete prn  - Syncope w/u   - GYN following    ACS: [id number]
69F pmh of HTN, HLD, hypothyroidism, left breast IDC s/p mastectomy with left sentinel lymph node biopsy s/p endocrine therapy no chemotherapy, uterovaginal prolapse s/p laparoscopic TAHBSO with sacrocolpopexy 11/12/24 presents with 1 day of nausea and vomiting, CTAP findings show SBO. Physical exam, labs, and imaging documented above.     PLAN:   - NGT removed  - Reg diet  - Monitor for bowel movements  - DVT ppx  - Convert IV to PO medications  - Restart home meds  - Monitor labs, replete prn  - Syncope w/u completed  - GYN following  - Dispo planning; possible discharge today    ACS: [id number]  
A/P: 68 yo postmenopausal PMHx HTN, DLD, hypothyroidism, h/o breast cancer s/p right mastectomy (2016) on anastrazole s/p robotic hysterectomy BSO and sacral colpopexy 11/12 CT A/P diagnostic for small bowel obstruction, admitted to general surgery for SBO.     - Elevated Troponin:  f/u TTE; cardiology consult; r/o MI.   - IV fluid hydration with LR   - Continue with NGT   - Home meds   - monitor vitals, pain management PRN   - Mgmt per primary team  - GYN ([id number]) to follow     
A/P: 70yo postmenopausal PMHx HTN, DLD, hypothyroidism, h/o breast cancer s/p right mastectomy (2016) on anastrazole s/p robotic hysterectomy BSO and sacral colpopexy 11/12 CT A/P diagnostic for small bowel obstruction, admitted to general surgery for SBO.     - prior imaging with concern for pneumothorax, resolved on last CXR  - Elevated Troponin:  f/u TTE; cardiology consult; r/o MI.   - IV fluid hydration with LR   - Continue with NGT   - Home meds   - monitor vitals, pain management PRN   - Mgmt per primary team  - GYN ([id number]) to follow 
    69F pmh of HTN, HLD, hypothyroidism, left breast IDC s/p mastectomy with left sentinel lymph node biopsy s/p endocrine therapy no chemotherapy, uterovaginal prolapse s/p laparoscopic TAHBSO with sacrocolpopexy 11/12/24 presents with 1 day of nausea and vomiting. Clinical presentation and radiographic findings consistent with SBO.    Plan    NPO  NGT to LCS  LR @ maintenance  Restart home meds when able  Lovenox for DVT PPX  Serial abdominal examinations  Monitor WBC and for fever, start abx if needed  Contrast study 12/1 after decompression    Surgery 0011    Case Discussed with Dr. Sharp
A/P: 68yo postmenopausal PMHx HTN, DLD, hypothyroidism, h/o breast cancer s/p right mastectomy (2016) on anastrazole s/p robotic hysterectomy BSO and sacral colpopexy 11/12 CT A/P diagnostic for small bowel obstruction, admitted to general surgery for SBO.     - fu results of follow through study  - IV fluid hydration with LR   - Continue with NGT   - Home meds   - monitor vitals, pain management PRN   - Mgmt per primary team  - GYN ([id number]) to follow

## 2024-12-03 NOTE — DISCHARGE NOTE PROVIDER - NSDCCPCAREPLAN_GEN_ALL_CORE_FT
PRINCIPAL DISCHARGE DIAGNOSIS  Diagnosis: SBO (small bowel obstruction)  Assessment and Plan of Treatment: You were admitted for a small bowel obstruction, which is a common complication after surgery.  Diet: Continue regular diet.   Activity: Ambulate and get out of bed as tolerated, and with assistance if feeling weak.  Pain: You can take over the counter medications such as Tylenol, and Ibuprofen for pain control. Please adhere to the instructions on the back of the bottle.  Follow up: Please follow up with your OB/GYN Dr. Jorgensen as scheduled for your post-op appointments.  If you develop fevers, chills, worsening pain, nausea that won't subside, vomiting, or any other symptoms of concern, please call MD or return to the ED for further advice, evaluation, and/or treatment.      SECONDARY DISCHARGE DIAGNOSES  Diagnosis: Syncope  Assessment and Plan of Treatment:     Diagnosis: Elevated troponin  Assessment and Plan of Treatment:

## 2024-12-11 DIAGNOSIS — K91.30 POSTPROCEDURAL INTESTINAL OBSTRUCTION, UNSPECIFIED AS TO PARTIAL VERSUS COMPLETE: ICD-10-CM

## 2024-12-11 DIAGNOSIS — Z79.82 LONG TERM (CURRENT) USE OF ASPIRIN: ICD-10-CM

## 2024-12-11 DIAGNOSIS — Z88.8 ALLERGY STATUS TO OTHER DRUGS, MEDICAMENTS AND BIOLOGICAL SUBSTANCES: ICD-10-CM

## 2024-12-11 DIAGNOSIS — Z79.890 HORMONE REPLACEMENT THERAPY: ICD-10-CM

## 2024-12-11 DIAGNOSIS — Z85.3 PERSONAL HISTORY OF MALIGNANT NEOPLASM OF BREAST: ICD-10-CM

## 2024-12-11 DIAGNOSIS — R79.89 OTHER SPECIFIED ABNORMAL FINDINGS OF BLOOD CHEMISTRY: ICD-10-CM

## 2024-12-11 DIAGNOSIS — Z51.11 ENCOUNTER FOR ANTINEOPLASTIC CHEMOTHERAPY: ICD-10-CM

## 2024-12-11 DIAGNOSIS — E86.0 DEHYDRATION: ICD-10-CM

## 2024-12-11 DIAGNOSIS — E78.5 HYPERLIPIDEMIA, UNSPECIFIED: ICD-10-CM
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
## 2024-12-11 DIAGNOSIS — Z92.29 PERSONAL HISTORY OF OTHER DRUG THERAPY: ICD-10-CM

## 2024-12-11 DIAGNOSIS — E87.6 HYPOKALEMIA: ICD-10-CM

## 2024-12-11 DIAGNOSIS — Z90.11 ACQUIRED ABSENCE OF RIGHT BREAST AND NIPPLE: ICD-10-CM

## 2024-12-11 DIAGNOSIS — E03.9 HYPOTHYROIDISM, UNSPECIFIED: ICD-10-CM
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
## 2024-12-11 DIAGNOSIS — I10 ESSENTIAL (PRIMARY) HYPERTENSION: ICD-10-CM

## 2024-12-11 DIAGNOSIS — Z79.02 LONG TERM (CURRENT) USE OF ANTITHROMBOTICS/ANTIPLATELETS: ICD-10-CM

## 2024-12-11 DIAGNOSIS — R55 SYNCOPE AND COLLAPSE: ICD-10-CM

## 2025-01-09 ENCOUNTER — APPOINTMENT (OUTPATIENT)
Dept: UROGYNECOLOGY | Facility: CLINIC | Age: 70
End: 2025-01-09
Payer: MEDICARE

## 2025-01-09 VITALS
HEIGHT: 66 IN | SYSTOLIC BLOOD PRESSURE: 114 MMHG | DIASTOLIC BLOOD PRESSURE: 67 MMHG | HEART RATE: 79 BPM | BODY MASS INDEX: 22.18 KG/M2 | WEIGHT: 138 LBS

## 2025-01-09 DIAGNOSIS — N81.3 COMPLETE UTEROVAGINAL PROLAPSE: ICD-10-CM

## 2025-01-09 DIAGNOSIS — Z87.42 PERSONAL HISTORY OF OTHER DISEASES OF THE FEMALE GENITAL TRACT: ICD-10-CM

## 2025-01-09 DIAGNOSIS — Z87.19 PERSONAL HISTORY OF OTHER DISEASES OF THE DIGESTIVE SYSTEM: ICD-10-CM

## 2025-01-09 DIAGNOSIS — N95.2 POSTMENOPAUSAL ATROPHIC VAGINITIS: ICD-10-CM

## 2025-01-09 PROCEDURE — 99024 POSTOP FOLLOW-UP VISIT: CPT

## 2025-04-19 NOTE — DISCHARGE NOTE NURSING/CASE MANAGEMENT/SOCIAL WORK - NSDCPEFALRISK_GEN_ALL_CORE
For information on Fall & Injury Prevention, visit: [email or web address] OR  [email or web address] OR  https://www.cdc.gov/steadi/patient.html
stated

## 2025-06-20 ENCOUNTER — LABORATORY RESULT (OUTPATIENT)
Age: 70
End: 2025-06-20

## 2025-06-20 PROBLEM — N76.0 VULVOVAGINITIS: Status: ACTIVE | Noted: 2025-06-20 | Resolved: 2025-07-20

## 2025-06-20 PROBLEM — R39.15 URINARY URGENCY: Status: ACTIVE | Noted: 2025-06-20

## 2025-06-20 RX ORDER — FLUCONAZOLE 150 MG/1
150 TABLET ORAL
Qty: 2 | Refills: 0 | Status: ACTIVE | COMMUNITY
Start: 2025-06-20 | End: 1900-01-01

## 2025-06-20 RX ORDER — CEFUROXIME AXETIL 250 MG/1
250 TABLET, FILM COATED ORAL
Qty: 10 | Refills: 0 | Status: ACTIVE | COMMUNITY
Start: 2025-06-20 | End: 1900-01-01

## 2025-06-24 LAB — BACTERIA UR CULT: ABNORMAL

## 2025-09-18 ENCOUNTER — NON-APPOINTMENT (OUTPATIENT)
Age: 70
End: 2025-09-18